# Patient Record
Sex: MALE
[De-identification: names, ages, dates, MRNs, and addresses within clinical notes are randomized per-mention and may not be internally consistent; named-entity substitution may affect disease eponyms.]

---

## 2023-03-04 ENCOUNTER — NURSE TRIAGE (OUTPATIENT)
Dept: OTHER | Facility: CLINIC | Age: 63
End: 2023-03-04

## 2023-03-05 NOTE — TELEPHONE ENCOUNTER
Location of patient: [place]    Subjective: Caller states \"I have a swollen jaw and it is painful. \"     Current Symptoms: left side jaw swelling, pain, hard to swallow and eat food, fatigue,     Onset: 1 week ago; sudden    Associated Symptoms: NA    Pain Severity: 6/10; aching; constant    Temperature: 99.6 orally    What has been tried: n/a    LMP: NA Pregnant: NA    Recommended disposition: Go to ED Now Patient is declining disposition and states he will go to walk in clinic in the am.    Care advice provided, patient verbalizes understanding; denies any other questions or concerns; instructed to call back for any new or worsening symptoms. Patient is declining disposition and states he will go to  [facility] in the am. I did advise patient to be seen tonight and gave him some risk factors for being seen. Patient states if he gets worse he will call back or go in tonight, otherwise he will go in the am.    This triage is a result of a call to [address] [place] [address]. Please do not respond to the triage nurse through this encounter. Any subsequent communication should be directly with the patient. Reason for Disposition   Fever    Protocols used:  Face Swelling-ADULT-AH

## 2023-04-10 ENCOUNTER — OFFICE VISIT (OUTPATIENT)
Dept: PRIMARY CARE | Facility: CLINIC | Age: 63
End: 2023-04-10
Payer: COMMERCIAL

## 2023-04-10 VITALS
DIASTOLIC BLOOD PRESSURE: 83 MMHG | HEART RATE: 67 BPM | SYSTOLIC BLOOD PRESSURE: 128 MMHG | BODY MASS INDEX: 30.1 KG/M2 | TEMPERATURE: 98 F | HEIGHT: 71 IN | OXYGEN SATURATION: 96 % | WEIGHT: 215 LBS

## 2023-04-10 DIAGNOSIS — N52.9 ERECTILE DYSFUNCTION, UNSPECIFIED ERECTILE DYSFUNCTION TYPE: ICD-10-CM

## 2023-04-10 DIAGNOSIS — Z12.5 PROSTATE CANCER SCREENING: ICD-10-CM

## 2023-04-10 DIAGNOSIS — Z00.00 ANNUAL PHYSICAL EXAM: Primary | ICD-10-CM

## 2023-04-10 DIAGNOSIS — Z00.00 HEALTHCARE MAINTENANCE: ICD-10-CM

## 2023-04-10 PROBLEM — J30.2 SEASONAL ALLERGIES: Status: ACTIVE | Noted: 2023-04-10

## 2023-04-10 PROBLEM — J20.9 ACUTE BRONCHITIS WITH BRONCHOSPASM: Status: ACTIVE | Noted: 2023-04-10

## 2023-04-10 PROBLEM — S39.012A LUMBOSACRAL STRAIN: Status: ACTIVE | Noted: 2023-04-10

## 2023-04-10 PROBLEM — H93.13 SUBJECTIVE TINNITUS OF BOTH EARS: Status: ACTIVE | Noted: 2023-04-10

## 2023-04-10 PROBLEM — R06.83 LOUD SNORING: Status: ACTIVE | Noted: 2023-04-10

## 2023-04-10 PROBLEM — G47.00 INSOMNIA: Status: ACTIVE | Noted: 2023-04-10

## 2023-04-10 PROBLEM — E66.811 CLASS 1 OBESITY WITH BODY MASS INDEX (BMI) OF 30.0 TO 30.9 IN ADULT: Status: ACTIVE | Noted: 2023-04-10

## 2023-04-10 PROBLEM — D12.6 TUBULAR ADENOMA OF COLON: Status: ACTIVE | Noted: 2023-04-10

## 2023-04-10 PROBLEM — K63.5 POLYP, COLONIC: Status: ACTIVE | Noted: 2023-04-10

## 2023-04-10 PROBLEM — E66.9 CLASS 1 OBESITY WITH BODY MASS INDEX (BMI) OF 30.0 TO 30.9 IN ADULT: Status: ACTIVE | Noted: 2023-04-10

## 2023-04-10 PROBLEM — J22 ACUTE RESPIRATORY INFECTION: Status: ACTIVE | Noted: 2023-04-10

## 2023-04-10 PROBLEM — R93.1 AGATSTON CORONARY ARTERY CALCIUM SCORE BETWEEN 200 AND 399: Status: ACTIVE | Noted: 2023-04-10

## 2023-04-10 PROBLEM — K11.20 SUBMANDIBULAR SIALOADENITIS: Status: ACTIVE | Noted: 2023-04-10

## 2023-04-10 PROBLEM — R59.0 LYMPHADENOPATHY, CERVICAL: Status: ACTIVE | Noted: 2023-04-10

## 2023-04-10 PROBLEM — H61.23 EXCESSIVE CERUMEN IN BOTH EAR CANALS: Status: ACTIVE | Noted: 2023-04-10

## 2023-04-10 PROBLEM — R05.9 COUGH: Status: ACTIVE | Noted: 2023-04-10

## 2023-04-10 PROBLEM — E78.5 HYPERLIPIDEMIA: Status: ACTIVE | Noted: 2023-04-10

## 2023-04-10 PROBLEM — J30.9 ALLERGIC RHINITIS: Status: ACTIVE | Noted: 2023-04-10

## 2023-04-10 PROBLEM — R73.01 IMPAIRED FASTING GLUCOSE: Status: ACTIVE | Noted: 2023-04-10

## 2023-04-10 PROBLEM — R22.1 NECK SWELLING: Status: ACTIVE | Noted: 2023-04-10

## 2023-04-10 PROCEDURE — 99396 PREV VISIT EST AGE 40-64: CPT | Performed by: INTERNAL MEDICINE

## 2023-04-10 PROCEDURE — 93000 ELECTROCARDIOGRAM COMPLETE: CPT | Performed by: INTERNAL MEDICINE

## 2023-04-10 RX ORDER — FEXOFENADINE HCL AND PSEUDOEPHEDRINE HCI 180; 240 MG/1; MG/1
1 TABLET, EXTENDED RELEASE ORAL AS NEEDED
COMMUNITY
End: 2024-06-06 | Stop reason: WASHOUT

## 2023-04-10 RX ORDER — ACETAMINOPHEN, DIPHENHYDRAMINE HCL, PHENYLEPHRINE HCL 325; 25; 5 MG/1; MG/1; MG/1
10 TABLET ORAL NIGHTLY PRN
COMMUNITY
End: 2024-06-06 | Stop reason: WASHOUT

## 2023-04-10 RX ORDER — ASCORBIC ACID 500 MG
TABLET ORAL
COMMUNITY
End: 2023-10-10 | Stop reason: SDUPTHER

## 2023-04-10 RX ORDER — SILDENAFIL 100 MG/1
100 TABLET, FILM COATED ORAL DAILY PRN
Qty: 12 TABLET | Refills: 3 | Status: SHIPPED | OUTPATIENT
Start: 2023-04-10 | End: 2024-04-23

## 2023-04-10 RX ORDER — UBIDECARENONE 75 MG
500 CAPSULE ORAL DAILY
COMMUNITY
End: 2024-06-06 | Stop reason: WASHOUT

## 2023-04-10 ASSESSMENT — PATIENT HEALTH QUESTIONNAIRE - PHQ9
1. LITTLE INTEREST OR PLEASURE IN DOING THINGS: NOT AT ALL
SUM OF ALL RESPONSES TO PHQ9 QUESTIONS 1 AND 2: 0
2. FEELING DOWN, DEPRESSED OR HOPELESS: NOT AT ALL

## 2023-04-10 NOTE — PROGRESS NOTES
"Subjective   Patient ID: Torsten Sheehan is a 62 y.o. male who presents for Annual Exam.    Here for CPE.  He was seen 3/2023 for covid and infected salivary gland  by ENT and ER,treated with Amoxicicllin,also took zinc and B12,vitamin C.  He quit smoking in 2005,was 1 ppd        Review of Systems   Constitutional:  Negative for fatigue and unexpected weight change.   HENT: Negative.  Negative for congestion.         As HPI.   Eyes: Negative.    Respiratory: Negative.  Negative for cough, chest tightness and shortness of breath.    Cardiovascular:  Negative for chest pain and leg swelling.   Gastrointestinal: Negative.  Negative for abdominal pain, blood in stool, diarrhea and nausea.   Endocrine: Negative.    Genitourinary: Negative.  Negative for difficulty urinating and dysuria.        Has ED.   Neurological: Negative.  Negative for dizziness and headaches.   Hematological: Negative.    Psychiatric/Behavioral: Negative.         Objective   /83 (BP Location: Left arm, Patient Position: Sitting, BP Cuff Size: Large adult)   Pulse 67   Temp 36.7 °C (98 °F) (Temporal)   Ht 1.803 m (5' 11\")   Wt 97.5 kg (215 lb)   SpO2 96%   BMI 29.99 kg/m²     Physical Exam  Vitals reviewed.   Constitutional:       Appearance: Normal appearance.   HENT:      Head: Normocephalic and atraumatic.      Right Ear: Tympanic membrane, ear canal and external ear normal.      Left Ear: Tympanic membrane, ear canal and external ear normal.      Mouth/Throat:      Mouth: Mucous membranes are moist.      Pharynx: Oropharynx is clear.   Eyes:      Extraocular Movements: Extraocular movements intact.      Pupils: Pupils are equal, round, and reactive to light.   Cardiovascular:      Rate and Rhythm: Normal rate and regular rhythm.      Heart sounds: Normal heart sounds.   Pulmonary:      Effort: Pulmonary effort is normal. No respiratory distress.      Breath sounds: Normal breath sounds. No wheezing or rhonchi.   Abdominal:      General: " Abdomen is flat. Bowel sounds are normal.      Palpations: Abdomen is soft.      Tenderness: There is no abdominal tenderness.   Musculoskeletal:         General: Normal range of motion.      Cervical back: Normal range of motion and neck supple.   Skin:     General: Skin is warm and dry.   Neurological:      General: No focal deficit present.      Mental Status: He is alert and oriented to person, place, and time.   Psychiatric:         Mood and Affect: Mood normal.         Behavior: Behavior normal.         Assessment/Plan

## 2023-04-15 ASSESSMENT — ENCOUNTER SYMPTOMS
NAUSEA: 0
DIZZINESS: 0
DIARRHEA: 0
DYSURIA: 0
RESPIRATORY NEGATIVE: 1
DIFFICULTY URINATING: 0
HEMATOLOGIC/LYMPHATIC NEGATIVE: 1
ENDOCRINE NEGATIVE: 1
FATIGUE: 0
SHORTNESS OF BREATH: 0
HEADACHES: 0
ABDOMINAL PAIN: 0
EYES NEGATIVE: 1
PSYCHIATRIC NEGATIVE: 1
CHEST TIGHTNESS: 0
COUGH: 0
UNEXPECTED WEIGHT CHANGE: 0
BLOOD IN STOOL: 0
NEUROLOGICAL NEGATIVE: 1
GASTROINTESTINAL NEGATIVE: 1

## 2023-04-15 NOTE — PATIENT INSTRUCTIONS
I spent >15 minutes face to face with individual providing recommendations for nutrition choices and exercise plan to help achieve weight reduction.  Order fasting labs.  See ENT for follow up.  Pt declined any immunizations.  Start Viagra PRN.  Return in 1 year and as needed.

## 2023-05-05 ENCOUNTER — LAB (OUTPATIENT)
Dept: LAB | Facility: LAB | Age: 63
End: 2023-05-05
Payer: COMMERCIAL

## 2023-05-05 DIAGNOSIS — Z12.5 PROSTATE CANCER SCREENING: ICD-10-CM

## 2023-05-05 DIAGNOSIS — Z00.00 ANNUAL PHYSICAL EXAM: ICD-10-CM

## 2023-05-05 LAB
ALANINE AMINOTRANSFERASE (SGPT) (U/L) IN SER/PLAS: 18 U/L (ref 10–52)
ALBUMIN (G/DL) IN SER/PLAS: 4.4 G/DL (ref 3.4–5)
ALKALINE PHOSPHATASE (U/L) IN SER/PLAS: 51 U/L (ref 33–136)
ANION GAP IN SER/PLAS: 15 MMOL/L (ref 10–20)
ASPARTATE AMINOTRANSFERASE (SGOT) (U/L) IN SER/PLAS: 14 U/L (ref 9–39)
BASOPHILS (10*3/UL) IN BLOOD BY AUTOMATED COUNT: 0.06 X10E9/L (ref 0–0.1)
BASOPHILS/100 LEUKOCYTES IN BLOOD BY AUTOMATED COUNT: 1 % (ref 0–2)
BILIRUBIN TOTAL (MG/DL) IN SER/PLAS: 0.8 MG/DL (ref 0–1.2)
CALCIUM (MG/DL) IN SER/PLAS: 9.3 MG/DL (ref 8.6–10.3)
CARBON DIOXIDE, TOTAL (MMOL/L) IN SER/PLAS: 26 MMOL/L (ref 21–32)
CHLORIDE (MMOL/L) IN SER/PLAS: 101 MMOL/L (ref 98–107)
CHOLESTEROL (MG/DL) IN SER/PLAS: 199 MG/DL (ref 0–199)
CHOLESTEROL IN HDL (MG/DL) IN SER/PLAS: 62.5 MG/DL
CHOLESTEROL/HDL RATIO: 3.2
CREATININE (MG/DL) IN SER/PLAS: 1.1 MG/DL (ref 0.5–1.3)
EOSINOPHILS (10*3/UL) IN BLOOD BY AUTOMATED COUNT: 0.26 X10E9/L (ref 0–0.7)
EOSINOPHILS/100 LEUKOCYTES IN BLOOD BY AUTOMATED COUNT: 4.2 % (ref 0–6)
ERYTHROCYTE DISTRIBUTION WIDTH (RATIO) BY AUTOMATED COUNT: 13.5 % (ref 11.5–14.5)
ERYTHROCYTE MEAN CORPUSCULAR HEMOGLOBIN CONCENTRATION (G/DL) BY AUTOMATED: 31.8 G/DL (ref 32–36)
ERYTHROCYTE MEAN CORPUSCULAR VOLUME (FL) BY AUTOMATED COUNT: 86 FL (ref 80–100)
ERYTHROCYTES (10*6/UL) IN BLOOD BY AUTOMATED COUNT: 5.17 X10E12/L (ref 4.5–5.9)
GFR MALE: 76 ML/MIN/1.73M2
GLUCOSE (MG/DL) IN SER/PLAS: 98 MG/DL (ref 74–99)
HEMATOCRIT (%) IN BLOOD BY AUTOMATED COUNT: 44.4 % (ref 41–52)
HEMOGLOBIN (G/DL) IN BLOOD: 14.1 G/DL (ref 13.5–17.5)
IMMATURE GRANULOCYTES/100 LEUKOCYTES IN BLOOD BY AUTOMATED COUNT: 0.2 % (ref 0–0.9)
LDL: 109 MG/DL (ref 0–99)
LEUKOCYTES (10*3/UL) IN BLOOD BY AUTOMATED COUNT: 6.1 X10E9/L (ref 4.4–11.3)
LYMPHOCYTES (10*3/UL) IN BLOOD BY AUTOMATED COUNT: 2.01 X10E9/L (ref 1.2–4.8)
LYMPHOCYTES/100 LEUKOCYTES IN BLOOD BY AUTOMATED COUNT: 32.8 % (ref 13–44)
MONOCYTES (10*3/UL) IN BLOOD BY AUTOMATED COUNT: 0.52 X10E9/L (ref 0.1–1)
MONOCYTES/100 LEUKOCYTES IN BLOOD BY AUTOMATED COUNT: 8.5 % (ref 2–10)
NEUTROPHILS (10*3/UL) IN BLOOD BY AUTOMATED COUNT: 3.27 X10E9/L (ref 1.2–7.7)
NEUTROPHILS/100 LEUKOCYTES IN BLOOD BY AUTOMATED COUNT: 53.3 % (ref 40–80)
PLATELETS (10*3/UL) IN BLOOD AUTOMATED COUNT: 180 X10E9/L (ref 150–450)
POTASSIUM (MMOL/L) IN SER/PLAS: 4 MMOL/L (ref 3.5–5.3)
PROSTATE SPECIFIC AG (NG/ML) IN SER/PLAS: 0.3 NG/ML (ref 0–4)
PROTEIN TOTAL: 6.9 G/DL (ref 6.4–8.2)
SODIUM (MMOL/L) IN SER/PLAS: 138 MMOL/L (ref 136–145)
THYROTROPIN (MIU/L) IN SER/PLAS BY DETECTION LIMIT <= 0.05 MIU/L: 2.06 MIU/L (ref 0.44–3.98)
TRIGLYCERIDE (MG/DL) IN SER/PLAS: 140 MG/DL (ref 0–149)
UREA NITROGEN (MG/DL) IN SER/PLAS: 16 MG/DL (ref 6–23)
VLDL: 28 MG/DL (ref 0–40)

## 2023-05-05 PROCEDURE — 80061 LIPID PANEL: CPT

## 2023-05-05 PROCEDURE — 84443 ASSAY THYROID STIM HORMONE: CPT

## 2023-05-05 PROCEDURE — 80053 COMPREHEN METABOLIC PANEL: CPT

## 2023-05-05 PROCEDURE — 36415 COLL VENOUS BLD VENIPUNCTURE: CPT

## 2023-05-05 PROCEDURE — 84153 ASSAY OF PSA TOTAL: CPT

## 2023-05-05 PROCEDURE — 83036 HEMOGLOBIN GLYCOSYLATED A1C: CPT

## 2023-05-05 PROCEDURE — 85025 COMPLETE CBC W/AUTO DIFF WBC: CPT

## 2023-05-06 LAB
ESTIMATED AVERAGE GLUCOSE FOR HBA1C: 123 MG/DL
HEMOGLOBIN A1C/HEMOGLOBIN TOTAL IN BLOOD: 5.9 %

## 2023-05-07 NOTE — RESULT ENCOUNTER NOTE
Your sugar and cholesterol have improved,rest of lab results within normal range.Follow low fat and low carb diet.

## 2023-06-01 ENCOUNTER — HOSPITAL ENCOUNTER (OUTPATIENT)
Dept: DATA CONVERSION | Facility: HOSPITAL | Age: 63
End: 2023-06-01
Attending: STUDENT IN AN ORGANIZED HEALTH CARE EDUCATION/TRAINING PROGRAM | Admitting: STUDENT IN AN ORGANIZED HEALTH CARE EDUCATION/TRAINING PROGRAM
Payer: COMMERCIAL

## 2023-06-01 DIAGNOSIS — R59.0 LOCALIZED ENLARGED LYMPH NODES: ICD-10-CM

## 2023-06-09 LAB
COMPLETE PATHOLOGY REPORT: NORMAL
CONVERTED CLINICAL DIAGNOSIS-HISTORY: NORMAL
CONVERTED FINAL DIAGNOSIS: NORMAL
CONVERTED FINAL REPORT PDF LINK TO COPY AND PASTE: NORMAL
CONVERTED GROSS DESCRIPTION: NORMAL

## 2023-09-07 VITALS — BODY MASS INDEX: 29.32 KG/M2 | HEIGHT: 71 IN | WEIGHT: 209.44 LBS

## 2023-10-10 ENCOUNTER — OFFICE VISIT (OUTPATIENT)
Dept: PRIMARY CARE | Facility: CLINIC | Age: 63
End: 2023-10-10
Payer: COMMERCIAL

## 2023-10-10 VITALS
HEIGHT: 70 IN | BODY MASS INDEX: 29.72 KG/M2 | SYSTOLIC BLOOD PRESSURE: 120 MMHG | WEIGHT: 207.6 LBS | HEART RATE: 72 BPM | DIASTOLIC BLOOD PRESSURE: 78 MMHG | OXYGEN SATURATION: 93 % | TEMPERATURE: 97.4 F

## 2023-10-10 DIAGNOSIS — G89.29 CHRONIC BILATERAL LOW BACK PAIN WITHOUT SCIATICA: ICD-10-CM

## 2023-10-10 DIAGNOSIS — E66.09 CLASS 1 OBESITY DUE TO EXCESS CALORIES WITHOUT SERIOUS COMORBIDITY WITH BODY MASS INDEX (BMI) OF 30.0 TO 30.9 IN ADULT: ICD-10-CM

## 2023-10-10 DIAGNOSIS — Z00.00 ANNUAL PHYSICAL EXAM: ICD-10-CM

## 2023-10-10 DIAGNOSIS — R73.01 IMPAIRED FASTING GLUCOSE: ICD-10-CM

## 2023-10-10 DIAGNOSIS — M54.50 CHRONIC BILATERAL LOW BACK PAIN WITHOUT SCIATICA: ICD-10-CM

## 2023-10-10 DIAGNOSIS — Z12.5 PROSTATE CANCER SCREENING: Primary | ICD-10-CM

## 2023-10-10 PROBLEM — R59.0 LYMPHADENOPATHY, CERVICAL: Status: RESOLVED | Noted: 2023-04-10 | Resolved: 2023-10-10

## 2023-10-10 PROBLEM — R22.1 NECK SWELLING: Status: RESOLVED | Noted: 2023-04-10 | Resolved: 2023-10-10

## 2023-10-10 PROCEDURE — 1036F TOBACCO NON-USER: CPT | Performed by: INTERNAL MEDICINE

## 2023-10-10 PROCEDURE — 3008F BODY MASS INDEX DOCD: CPT | Performed by: INTERNAL MEDICINE

## 2023-10-10 PROCEDURE — 99214 OFFICE O/P EST MOD 30 MIN: CPT | Performed by: INTERNAL MEDICINE

## 2023-10-10 ASSESSMENT — ENCOUNTER SYMPTOMS
BACK PAIN: 1
RESPIRATORY NEGATIVE: 1
EYES NEGATIVE: 1
SHORTNESS OF BREATH: 0
HEMATOLOGIC/LYMPHATIC NEGATIVE: 1
DIZZINESS: 0
UNEXPECTED WEIGHT CHANGE: 0
HEADACHES: 0
FATIGUE: 0
BLOOD IN STOOL: 0
CHEST TIGHTNESS: 0
DIFFICULTY URINATING: 0
ENDOCRINE NEGATIVE: 1
PSYCHIATRIC NEGATIVE: 1
ABDOMINAL PAIN: 0
DIARRHEA: 0
COUGH: 0
NAUSEA: 0
DYSURIA: 0
GASTROINTESTINAL NEGATIVE: 1
NEUROLOGICAL NEGATIVE: 1

## 2023-10-10 NOTE — ASSESSMENT & PLAN NOTE
Follow 1800-calorie ADA diet and will repeat his lab at his next physical in April, lab order in.  Continue regular walking.  Patient refused flu vaccine.

## 2023-10-10 NOTE — PROGRESS NOTES
"Subjective   Patient ID: Torsten Sheehan is a 63 y.o. male who presents for 6 month follow up .    Here to follow-up on his general medical condition and to go again over his lab result.  He has been walking regularly .  He had biopsy of the lymph node left side neck and was benign, he denies any trouble swallowing night sweats or weight loss.  He has been seen by a chiropractor for low back pain and had x-ray there has been doing stretching exercise there is some improvement with his low back pain. Pain started after he lifted up a heavy suitcase.  Pt never seen by a dermatologist for general skin exam and asking if he needed to, I added I advised him to see dermatologist for general skin examination and I put a referral in.         Review of Systems   Constitutional:  Negative for fatigue and unexpected weight change.   HENT: Negative.  Negative for congestion.    Eyes: Negative.    Respiratory: Negative.  Negative for cough, chest tightness and shortness of breath.    Cardiovascular:  Negative for chest pain and leg swelling.   Gastrointestinal: Negative.  Negative for abdominal pain, blood in stool, diarrhea and nausea.   Endocrine: Negative.    Genitourinary: Negative.  Negative for difficulty urinating and dysuria.   Musculoskeletal:  Positive for back pain.   Neurological: Negative.  Negative for dizziness and headaches.   Hematological: Negative.    Psychiatric/Behavioral: Negative.         Objective   /78 (BP Location: Right arm, Patient Position: Sitting)   Pulse 72   Temp 36.3 °C (97.4 °F) (Temporal)   Ht 1.778 m (5' 10\")   Wt 94.2 kg (207 lb 9.6 oz)   SpO2 93%   BMI 29.79 kg/m²     Physical Exam  Vitals reviewed.   Constitutional:       Appearance: Normal appearance.   HENT:      Head: Normocephalic and atraumatic.   Eyes:      Extraocular Movements: Extraocular movements intact.      Pupils: Pupils are equal, round, and reactive to light.   Cardiovascular:      Rate and Rhythm: Normal rate and " regular rhythm.      Heart sounds: Normal heart sounds.   Pulmonary:      Effort: Pulmonary effort is normal. No respiratory distress.      Breath sounds: Normal breath sounds. No wheezing or rhonchi.   Abdominal:      General: Abdomen is flat. Bowel sounds are normal.      Palpations: Abdomen is soft.      Tenderness: There is no abdominal tenderness.   Musculoskeletal:         General: Normal range of motion.      Cervical back: Normal range of motion and neck supple.      Right lower leg: No edema.      Left lower leg: No edema.   Lymphadenopathy:      Cervical: No cervical adenopathy.   Skin:     General: Skin is warm and dry.   Neurological:      General: No focal deficit present.      Mental Status: He is alert and oriented to person, place, and time.   Psychiatric:         Mood and Affect: Mood normal.         Behavior: Behavior normal.         Assessment/Plan   Problem List Items Addressed This Visit             ICD-10-CM    Impaired fasting glucose R73.01     Follow 1800-calorie ADA diet and will repeat his lab at his next physical in April, lab order in.  Continue regular walking.  Patient refused flu vaccine.         Class 1 obesity with body mass index (BMI) of 30.0 to 30.9 in adult E66.9, Z68.30     Improved with walking .  counseling for healthy eating habit provided his BMI is now 29.79.         Chronic bilateral low back pain without sciatica M54.50, G89.29     Improved with chiropractor, advised to continue stretching exercise.          Other Visit Diagnoses         Codes    Prostate cancer screening    -  Primary Z12.5    Relevant Orders    Prostate Specific Antigen    Annual physical exam     Z00.00    Relevant Orders    CBC and Auto Differential    Comprehensive Metabolic Panel    Hemoglobin A1C    Lipid Panel    Referral to Dermatology

## 2024-01-12 ENCOUNTER — TELEMEDICINE (OUTPATIENT)
Dept: PRIMARY CARE | Facility: CLINIC | Age: 64
End: 2024-01-12
Payer: COMMERCIAL

## 2024-01-12 VITALS
BODY MASS INDEX: 30.28 KG/M2 | HEART RATE: 88 BPM | SYSTOLIC BLOOD PRESSURE: 126 MMHG | WEIGHT: 211 LBS | OXYGEN SATURATION: 98 % | DIASTOLIC BLOOD PRESSURE: 75 MMHG

## 2024-01-12 DIAGNOSIS — J01.80 ACUTE NON-RECURRENT SINUSITIS OF OTHER SINUS: Primary | ICD-10-CM

## 2024-01-12 PROCEDURE — 99213 OFFICE O/P EST LOW 20 MIN: CPT | Performed by: NURSE PRACTITIONER

## 2024-01-12 RX ORDER — ALBUTEROL SULFATE 90 UG/1
2 AEROSOL, METERED RESPIRATORY (INHALATION) EVERY 6 HOURS PRN
Qty: 18 G | Refills: 11 | Status: SHIPPED | OUTPATIENT
Start: 2024-01-12 | End: 2025-01-11

## 2024-01-12 RX ORDER — BENZONATATE 200 MG/1
200 CAPSULE ORAL 3 TIMES DAILY PRN
Qty: 42 CAPSULE | Refills: 0 | Status: SHIPPED | OUTPATIENT
Start: 2024-01-12 | End: 2024-02-11

## 2024-01-12 RX ORDER — AZITHROMYCIN 250 MG/1
TABLET, FILM COATED ORAL
Qty: 6 TABLET | Refills: 1 | Status: SHIPPED | OUTPATIENT
Start: 2024-01-12 | End: 2024-01-17

## 2024-01-12 ASSESSMENT — ENCOUNTER SYMPTOMS
WHEEZING: 0
SWOLLEN GLANDS: 0
RHINORRHEA: 1
COUGH: 1
SORE THROAT: 0
SINUS PAIN: 1
VOMITING: 0
DIARRHEA: 0
HEADACHES: 0
NAUSEA: 0
DYSURIA: 0

## 2024-01-12 NOTE — PROGRESS NOTES
Subjective   Patient ID: Torsten Sheehan is a 63 y.o. male who presents for URI (Neg covid test.).    Presents with a lot congestion which is worsening.     Has used chi seltzer cold and sinus and albuterol.  Tried using a steam at night.     URI   There has been no fever. Associated symptoms include congestion, coughing, rhinorrhea and sinus pain. Pertinent negatives include no chest pain, diarrhea, dysuria, ear pain, headaches, nausea, plugged ear sensation, sore throat, swollen glands, vomiting or wheezing.        Review of Systems   HENT:  Positive for congestion, rhinorrhea and sinus pain. Negative for ear pain and sore throat.    Respiratory:  Positive for cough. Negative for wheezing.    Cardiovascular:  Negative for chest pain.   Gastrointestinal:  Negative for diarrhea, nausea and vomiting.   Genitourinary:  Negative for dysuria.   Neurological:  Negative for headaches.     otherwise negative aside from what was mentioned above in HPI.    Objective   /75   Pulse 88   Wt 95.7 kg (211 lb)   SpO2 98%   BMI 30.28 kg/m²     Physical Exam  This visit was completed via telephone/virtual visit. All issues as documented below were discussed and addressed but no physical exam was performed. If it was felt that the patient should be evaluated via  face-to-face office appointment(s) they were directed there. The patient verbally consented to this visit.    Assessment/Plan   Problem List Items Addressed This Visit    None  Visit Diagnoses         Codes    Acute non-recurrent sinusitis of other sinus    -  Primary J01.80    Relevant Medications    azithromycin (Zithromax) 250 mg tablet    albuterol 90 mcg/actuation inhaler    benzonatate (Tessalon) 200 mg capsule          Nasal rinses, humification/hot shower until mucous drains, increase fluid intake aiming for half your body weight in oz of water daily. This will help to thin mucous secretion and replace fluids that have been lost.  Warm, moist air, nasal  saline hydration, avoidance of nasal irritants including cigarette smoke.  Sip hot or warm drinks, this may soothe nasal passages  Avoid extremely cold and dry air  Tylenol/Advil for muscle aches and fever.  Warm salt water gargles, throat lozenges and popsicle's for any sore throat.    Return in 1 week if not improving.

## 2024-02-26 ENCOUNTER — OFFICE VISIT (OUTPATIENT)
Dept: DERMATOLOGY | Facility: CLINIC | Age: 64
End: 2024-02-26
Payer: COMMERCIAL

## 2024-02-26 DIAGNOSIS — Z00.00 ANNUAL PHYSICAL EXAM: ICD-10-CM

## 2024-02-26 DIAGNOSIS — L81.4 LENTIGO: ICD-10-CM

## 2024-02-26 DIAGNOSIS — D18.01 HEMANGIOMA OF SKIN: ICD-10-CM

## 2024-02-26 DIAGNOSIS — L57.0 ACTINIC KERATOSES: ICD-10-CM

## 2024-02-26 DIAGNOSIS — L57.8 OTHER SKIN CHANGES DUE TO CHRONIC EXPOSURE TO NONIONIZING RADIATION: Primary | ICD-10-CM

## 2024-02-26 DIAGNOSIS — L82.1 SEBORRHEIC KERATOSIS: ICD-10-CM

## 2024-02-26 PROCEDURE — 1036F TOBACCO NON-USER: CPT | Performed by: STUDENT IN AN ORGANIZED HEALTH CARE EDUCATION/TRAINING PROGRAM

## 2024-02-26 PROCEDURE — 17003 DESTRUCT PREMALG LES 2-14: CPT | Performed by: STUDENT IN AN ORGANIZED HEALTH CARE EDUCATION/TRAINING PROGRAM

## 2024-02-26 PROCEDURE — 17000 DESTRUCT PREMALG LESION: CPT | Performed by: STUDENT IN AN ORGANIZED HEALTH CARE EDUCATION/TRAINING PROGRAM

## 2024-02-26 PROCEDURE — 99203 OFFICE O/P NEW LOW 30 MIN: CPT | Performed by: STUDENT IN AN ORGANIZED HEALTH CARE EDUCATION/TRAINING PROGRAM

## 2024-02-26 PROCEDURE — 3008F BODY MASS INDEX DOCD: CPT | Performed by: STUDENT IN AN ORGANIZED HEALTH CARE EDUCATION/TRAINING PROGRAM

## 2024-02-26 NOTE — PROGRESS NOTES
Subjective     Torsten Sheehan is a 63 y.o. male who presents for the following: Skin Check (FBSE. No family or personal history of skin cancer. ).     Review of Systems:  No other skin or systemic complaints other than what is documented elsewhere in the note.    The following portions of the chart were reviewed this encounter and updated as appropriate:         Skin Cancer History  No skin cancer on file.      Specialty Problems    None       Objective   Well appearing patient in no apparent distress; mood and affect are within normal limits.    A full examination was performed including scalp, head, eyes, ears, nose, lips, neck, chest, axillae, abdomen, back, buttocks, bilateral upper extremities, bilateral lower extremities, hands, feet, fingers, toes, fingernails, and toenails. All findings within normal limits unless otherwise noted below.    Assessment/Plan   1. Other skin changes due to chronic exposure to nonionizing radiation  Mottled pigmentation, telangiectasias and brown reticular macules in sun exposed areas on the face, extremities, trunk    The risk of chronic, cumulative sun damage and risk of development of skin cancer was reviewed with the patient today. Discussed important of sun protection with sun protective clothing and/or broad spectrum sunscreen spf 30 or above.  Warning signs of skin cancer were reviewed. Patient to contact office should they notice any new or changing pre-existing skin lesion.    2. Annual physical exam    Related Procedures  Referral to Dermatology    3. Seborrheic keratosis  Stuck on verrucous, tan-brown papules and plaques.      The benign nature of these skin lesions were reviewed with the patient today and reassurance was provided. Patient advised to return for f/u if the lesions change in size, shape, color, become painful, tender, itch or bleed.    4. Hemangioma of skin  Bright red papules    Discussed benign nature of condition, reassured. Reviewed warning signs of  skin cancer with patient.    5. Lentigo  Scattered tan macules in sun-exposed areas.    Benign nature of these skin lesions reviewed and relation to sun exposure discussed. Reassurance provided. Reviewed warning signs of skin cancer.    6. Actinic keratoses (5)  Mid Parietal Scalp (5)  Erythematous gritty macule(s)    Lesions are due to chronic, cumulative sun damage over time and are pre-cancerous. They have a risk of developing into squamous cell carcinoma and therefore treatment recommendations were offered and discussed with the patient. Discussed LN2 & topical chemotherapy creams, risks and benefits of each. The risks and benefits of LN2 were reviewed including incomplete removal, crusting, blister hypo and/or hyperpigmentation, scarring. The patient elected for LN2 today.    Destr of lesion - Mid Parietal Scalp (5)  Complexity: simple    Destruction method: cryotherapy    Informed consent: discussed and consent obtained    Lesion destroyed using liquid nitrogen: Yes    Cryotherapy cycles:  1  Outcome: patient tolerated procedure well with no complications    Post-procedure details: wound care instructions given

## 2024-04-10 ENCOUNTER — APPOINTMENT (OUTPATIENT)
Dept: PRIMARY CARE | Facility: CLINIC | Age: 64
End: 2024-04-10
Payer: COMMERCIAL

## 2024-04-23 ENCOUNTER — OFFICE VISIT (OUTPATIENT)
Dept: PRIMARY CARE | Facility: CLINIC | Age: 64
End: 2024-04-23
Payer: COMMERCIAL

## 2024-04-23 VITALS
DIASTOLIC BLOOD PRESSURE: 75 MMHG | WEIGHT: 210.4 LBS | HEART RATE: 65 BPM | HEIGHT: 71 IN | BODY MASS INDEX: 29.46 KG/M2 | OXYGEN SATURATION: 95 % | SYSTOLIC BLOOD PRESSURE: 120 MMHG

## 2024-04-23 DIAGNOSIS — I71.21 ANEURYSM OF ASCENDING AORTA WITHOUT RUPTURE (CMS-HCC): ICD-10-CM

## 2024-04-23 DIAGNOSIS — R93.1 AGATSTON CAC SCORE 200-399: ICD-10-CM

## 2024-04-23 DIAGNOSIS — E78.49 OTHER HYPERLIPIDEMIA: ICD-10-CM

## 2024-04-23 DIAGNOSIS — R93.1 AGATSTON CORONARY ARTERY CALCIUM SCORE BETWEEN 200 AND 399: ICD-10-CM

## 2024-04-23 DIAGNOSIS — D12.6 TUBULAR ADENOMA OF COLON: ICD-10-CM

## 2024-04-23 DIAGNOSIS — Z00.00 HEALTHCARE MAINTENANCE: ICD-10-CM

## 2024-04-23 DIAGNOSIS — J30.9 ALLERGIC RHINITIS, UNSPECIFIED SEASONALITY, UNSPECIFIED TRIGGER: ICD-10-CM

## 2024-04-23 DIAGNOSIS — Z00.00 ANNUAL PHYSICAL EXAM: Primary | ICD-10-CM

## 2024-04-23 DIAGNOSIS — K11.20 SUBMANDIBULAR SIALOADENITIS: ICD-10-CM

## 2024-04-23 PROBLEM — N52.9 ERECTILE DYSFUNCTION: Status: ACTIVE | Noted: 2024-04-23

## 2024-04-23 PROCEDURE — 3008F BODY MASS INDEX DOCD: CPT | Performed by: INTERNAL MEDICINE

## 2024-04-23 PROCEDURE — 99396 PREV VISIT EST AGE 40-64: CPT | Performed by: INTERNAL MEDICINE

## 2024-04-23 PROCEDURE — 93000 ELECTROCARDIOGRAM COMPLETE: CPT | Performed by: INTERNAL MEDICINE

## 2024-04-23 RX ORDER — ROSUVASTATIN CALCIUM 5 MG/1
5 TABLET, COATED ORAL DAILY
Qty: 100 TABLET | Refills: 3 | Status: SHIPPED | OUTPATIENT
Start: 2024-04-23 | End: 2025-05-28

## 2024-04-23 ASSESSMENT — PATIENT HEALTH QUESTIONNAIRE - PHQ9
2. FEELING DOWN, DEPRESSED OR HOPELESS: NOT AT ALL
SUM OF ALL RESPONSES TO PHQ9 QUESTIONS 1 AND 2: 0
1. LITTLE INTEREST OR PLEASURE IN DOING THINGS: NOT AT ALL

## 2024-04-23 NOTE — PROGRESS NOTES
"Subjective   Patient ID: Torsten Sheehan is a 63 y.o. male who presents for Annual Exam.    Here for CPE.  He quit smoking in 2005,was 1 ppd   Last colonoscopy 1/7/2021,next in 5 years.  He sees chiropractor for his back pain,resolved.  He sees dermatologist.  He denies any chest pain shortness of breath or cough no recurrence of his neck mass, he was seen by ENT in past.  I Reviewed with patient his CT from February 2022 which showed elevated cardiac calcium score in the 225  and thoracic aortic aneurysm of 4 cm.           Review of Systems   Constitutional:  Negative for fatigue and unexpected weight change.   HENT: Negative.  Negative for congestion.    Eyes: Negative.    Respiratory: Negative.  Negative for cough, chest tightness and shortness of breath.    Cardiovascular:  Negative for chest pain and leg swelling.   Gastrointestinal: Negative.  Negative for abdominal pain, blood in stool, diarrhea and nausea.   Endocrine: Negative.    Genitourinary: Negative.  Negative for difficulty urinating and dysuria.   Musculoskeletal:         As HPI   Neurological: Negative.  Negative for dizziness and headaches.   Hematological: Negative.    Psychiatric/Behavioral: Negative.         Objective   /75 (BP Location: Left arm, Patient Position: Sitting, BP Cuff Size: Large adult)   Pulse 65   Ht 1.797 m (5' 10.75\")   Wt 95.4 kg (210 lb 6.4 oz)   SpO2 95%   BMI 29.55 kg/m²     Physical Exam  Vitals reviewed.   Constitutional:       General: He is not in acute distress.     Appearance: Normal appearance.   HENT:      Head: Normocephalic and atraumatic.      Right Ear: Tympanic membrane and ear canal normal.      Left Ear: Tympanic membrane and ear canal normal.      Nose: Nose normal.      Mouth/Throat:      Mouth: Mucous membranes are moist.      Pharynx: No oropharyngeal exudate or posterior oropharyngeal erythema.   Eyes:      Extraocular Movements: Extraocular movements intact.      Pupils: Pupils are equal, round, " and reactive to light.   Neck:      Vascular: No carotid bruit.   Cardiovascular:      Rate and Rhythm: Normal rate and regular rhythm.      Pulses: Normal pulses.      Heart sounds: Normal heart sounds. No murmur heard.  Pulmonary:      Effort: Pulmonary effort is normal.      Breath sounds: Normal breath sounds.   Abdominal:      General: Abdomen is flat. Bowel sounds are normal.      Palpations: Abdomen is soft.   Musculoskeletal:         General: Normal range of motion.      Cervical back: Normal range of motion and neck supple.   Lymphadenopathy:      Cervical: No cervical adenopathy.   Skin:     Comments: Skin exam done with dermatologist   Neurological:      General: No focal deficit present.      Mental Status: He is alert and oriented to person, place, and time.   Psychiatric:         Mood and Affect: Mood normal.         Assessment/Plan   Problem List Items Addressed This Visit             ICD-10-CM    Agatston coronary artery calcium score between 200 and 399 R93.1     Refer to cardiologist start statin.  Patient denies any chest pain.           Allergic rhinitis J30.9     Stable on current medication         Hyperlipidemia E78.5     Atherosclerotic vascular disease 7.2%.  He had cardiac calcium score of 225.  Will start statin and refer to cardiologist also to evaluate the ascending aortic aneurysm.  Will order CTA.         Submandibular sialoadenitis K11.20     No recurrence.  Was seen by ENT in past.         Tubular adenoma of colon D12.6     Patient denies any change in BM or blood in his stool.  Next colonoscopy in January 2026.         Annual physical exam - Primary Z00.00     Complete physical examination completed today.  Advised to keep a heart healthy, low-fat diet as recommended diet is the Mediterranean diet.  Advised to exercise regularly for 30 minutes daily 5 days a week and maintain 150 minutes of exercise per week.  Discussed age-appropriate cancer screening,Immunization and  recommendation were given.  Advised on regular eye and dental visit.  Advised on staying well-hydrated.          Other Visit Diagnoses         Codes    Healthcare maintenance     Z00.00    Relevant Orders    ECG 12 lead (Clinic Performed) (Completed)    Agatston CAC score 200-399     R93.1    Relevant Medications    rosuvastatin (Crestor) 5 mg tablet    Other Relevant Orders    Referral to Cardiology    Aneurysm of ascending aorta without rupture (CMS-HCC)     I71.21    Relevant Orders    CT angio chest w and wo IV contrast

## 2024-04-25 PROBLEM — J20.9 ACUTE BRONCHITIS WITH BRONCHOSPASM: Status: RESOLVED | Noted: 2023-04-10 | Resolved: 2024-04-25

## 2024-04-25 PROBLEM — J22 ACUTE RESPIRATORY INFECTION: Status: RESOLVED | Noted: 2023-04-10 | Resolved: 2024-04-25

## 2024-04-25 ASSESSMENT — ENCOUNTER SYMPTOMS
DYSURIA: 0
EYES NEGATIVE: 1
RESPIRATORY NEGATIVE: 1
FATIGUE: 0
HEADACHES: 0
UNEXPECTED WEIGHT CHANGE: 0
BLOOD IN STOOL: 0
DIZZINESS: 0
CHEST TIGHTNESS: 0
COUGH: 0
NEUROLOGICAL NEGATIVE: 1
DIFFICULTY URINATING: 0
SHORTNESS OF BREATH: 0
GASTROINTESTINAL NEGATIVE: 1
ABDOMINAL PAIN: 0
HEMATOLOGIC/LYMPHATIC NEGATIVE: 1
PSYCHIATRIC NEGATIVE: 1
ENDOCRINE NEGATIVE: 1
DIARRHEA: 0
NAUSEA: 0

## 2024-05-02 ENCOUNTER — LAB (OUTPATIENT)
Dept: LAB | Facility: LAB | Age: 64
End: 2024-05-02
Payer: COMMERCIAL

## 2024-05-02 DIAGNOSIS — Z00.00 ANNUAL PHYSICAL EXAM: ICD-10-CM

## 2024-05-02 DIAGNOSIS — Z12.5 PROSTATE CANCER SCREENING: ICD-10-CM

## 2024-05-02 LAB
ALBUMIN SERPL BCP-MCNC: 4.1 G/DL (ref 3.4–5)
ALP SERPL-CCNC: 52 U/L (ref 33–136)
ALT SERPL W P-5'-P-CCNC: 19 U/L (ref 10–52)
ANION GAP SERPL CALC-SCNC: 14 MMOL/L (ref 10–20)
AST SERPL W P-5'-P-CCNC: 16 U/L (ref 9–39)
BASOPHILS # BLD AUTO: 0.05 X10*3/UL (ref 0–0.1)
BASOPHILS NFR BLD AUTO: 0.9 %
BILIRUB SERPL-MCNC: 0.7 MG/DL (ref 0–1.2)
BUN SERPL-MCNC: 14 MG/DL (ref 6–23)
CALCIUM SERPL-MCNC: 9.2 MG/DL (ref 8.6–10.6)
CHLORIDE SERPL-SCNC: 106 MMOL/L (ref 98–107)
CHOLEST SERPL-MCNC: 150 MG/DL (ref 0–199)
CHOLESTEROL/HDL RATIO: 2.3
CO2 SERPL-SCNC: 28 MMOL/L (ref 21–32)
CREAT SERPL-MCNC: 1.03 MG/DL (ref 0.5–1.3)
EGFRCR SERPLBLD CKD-EPI 2021: 83 ML/MIN/1.73M*2
EOSINOPHIL # BLD AUTO: 0.29 X10*3/UL (ref 0–0.7)
EOSINOPHIL NFR BLD AUTO: 5.3 %
ERYTHROCYTE [DISTWIDTH] IN BLOOD BY AUTOMATED COUNT: 13.2 % (ref 11.5–14.5)
EST. AVERAGE GLUCOSE BLD GHB EST-MCNC: 126 MG/DL
GLUCOSE SERPL-MCNC: 99 MG/DL (ref 74–99)
HBA1C MFR BLD: 6 %
HCT VFR BLD AUTO: 44.3 % (ref 41–52)
HDLC SERPL-MCNC: 65.8 MG/DL
HGB BLD-MCNC: 13.9 G/DL (ref 13.5–17.5)
IMM GRANULOCYTES # BLD AUTO: 0 X10*3/UL (ref 0–0.7)
IMM GRANULOCYTES NFR BLD AUTO: 0 % (ref 0–0.9)
LDLC SERPL CALC-MCNC: 65 MG/DL
LYMPHOCYTES # BLD AUTO: 1.77 X10*3/UL (ref 1.2–4.8)
LYMPHOCYTES NFR BLD AUTO: 32.3 %
MCH RBC QN AUTO: 26.7 PG (ref 26–34)
MCHC RBC AUTO-ENTMCNC: 31.4 G/DL (ref 32–36)
MCV RBC AUTO: 85 FL (ref 80–100)
MONOCYTES # BLD AUTO: 0.46 X10*3/UL (ref 0.1–1)
MONOCYTES NFR BLD AUTO: 8.4 %
NEUTROPHILS # BLD AUTO: 2.91 X10*3/UL (ref 1.2–7.7)
NEUTROPHILS NFR BLD AUTO: 53.1 %
NON HDL CHOLESTEROL: 84 MG/DL (ref 0–149)
NRBC BLD-RTO: 0 /100 WBCS (ref 0–0)
PLATELET # BLD AUTO: 166 X10*3/UL (ref 150–450)
POTASSIUM SERPL-SCNC: 4.9 MMOL/L (ref 3.5–5.3)
PROT SERPL-MCNC: 6.2 G/DL (ref 6.4–8.2)
PSA SERPL-MCNC: 0.24 NG/ML
RBC # BLD AUTO: 5.2 X10*6/UL (ref 4.5–5.9)
SODIUM SERPL-SCNC: 143 MMOL/L (ref 136–145)
TRIGL SERPL-MCNC: 94 MG/DL (ref 0–149)
VLDL: 19 MG/DL (ref 0–40)
WBC # BLD AUTO: 5.5 X10*3/UL (ref 4.4–11.3)

## 2024-05-02 PROCEDURE — 85025 COMPLETE CBC W/AUTO DIFF WBC: CPT

## 2024-05-02 PROCEDURE — 80053 COMPREHEN METABOLIC PANEL: CPT

## 2024-05-02 PROCEDURE — 84153 ASSAY OF PSA TOTAL: CPT

## 2024-05-02 PROCEDURE — 83036 HEMOGLOBIN GLYCOSYLATED A1C: CPT

## 2024-05-02 PROCEDURE — 36415 COLL VENOUS BLD VENIPUNCTURE: CPT

## 2024-05-02 PROCEDURE — 80061 LIPID PANEL: CPT

## 2024-05-02 NOTE — RESULT ENCOUNTER NOTE
Your A1c is 6, in the prediabetes range rest of lab results are stable please follow low carbohydrate diet.

## 2024-05-03 ENCOUNTER — OFFICE VISIT (OUTPATIENT)
Dept: CARDIOLOGY | Facility: CLINIC | Age: 64
End: 2024-05-03
Payer: COMMERCIAL

## 2024-05-03 VITALS
HEART RATE: 70 BPM | DIASTOLIC BLOOD PRESSURE: 80 MMHG | SYSTOLIC BLOOD PRESSURE: 128 MMHG | HEIGHT: 70 IN | WEIGHT: 210 LBS | BODY MASS INDEX: 30.06 KG/M2 | OXYGEN SATURATION: 96 %

## 2024-05-03 DIAGNOSIS — I25.10 CORONARY ARTERY DISEASE INVOLVING NATIVE CORONARY ARTERY OF NATIVE HEART WITHOUT ANGINA PECTORIS: ICD-10-CM

## 2024-05-03 DIAGNOSIS — R94.31 ABNORMAL EKG: Primary | ICD-10-CM

## 2024-05-03 DIAGNOSIS — E78.49 OTHER HYPERLIPIDEMIA: ICD-10-CM

## 2024-05-03 DIAGNOSIS — I77.810 ASCENDING AORTA DILATATION (CMS-HCC): ICD-10-CM

## 2024-05-03 DIAGNOSIS — R06.09 EXERTIONAL DYSPNEA: ICD-10-CM

## 2024-05-03 PROBLEM — R93.1 AGATSTON CORONARY ARTERY CALCIUM SCORE BETWEEN 200 AND 399: Status: RESOLVED | Noted: 2023-04-10 | Resolved: 2024-05-03

## 2024-05-03 PROCEDURE — 1036F TOBACCO NON-USER: CPT | Performed by: INTERNAL MEDICINE

## 2024-05-03 PROCEDURE — 99203 OFFICE O/P NEW LOW 30 MIN: CPT | Performed by: INTERNAL MEDICINE

## 2024-05-03 PROCEDURE — 3008F BODY MASS INDEX DOCD: CPT | Performed by: INTERNAL MEDICINE

## 2024-05-03 ASSESSMENT — ENCOUNTER SYMPTOMS: DYSPNEA ON EXERTION: 1

## 2024-05-03 NOTE — PROGRESS NOTES
"Subjective   Torsten Sheehan is a 63 y.o. male.    Chief Complaint:  Cardiac evaluation for coronary artery disease, dilated ascending aorta.  Fatigue and exertional dyspnea.    HPI    This is a 63-year-old gentleman who presents for cardiac evaluation.  The patient has symptoms of mild exertional dyspnea and generalized fatigue.  Denies chest discomfort or chest pressure.    His cardiac history is significant for coronary artery disease based on a positive CT calcium score of 226.  This is consistent with the presence of mild to moderate atherosclerotic coronary artery disease.    Patient has a mildly dilated ascending aorta at 4.0 cm.    Past medical history: Significant for hernia repair and a lymph node biopsy.  He has borderline diabetes.  History of hyperlipidemia.    Allergies to medications: None known    Social history: Former smoker, quit in 2005.    Family history: Mother had myocardial infarction's in her 70s but lived to .  Father had coronary disease diabetes and bladder cancer.    Review of Systems   Constitutional: Positive for malaise/fatigue.   Cardiovascular:  Positive for dyspnea on exertion.       Visit Vitals  /80 (BP Location: Left arm)   Pulse 70   Ht 1.778 m (5' 10\")   Wt 95.3 kg (210 lb)   SpO2 96%   BMI 30.13 kg/m²   Smoking Status Former   BSA 2.17 m²        Objective     Constitutional:       Appearance: Not in distress.   Neck:      Vascular: JVD normal.   Pulmonary:      Breath sounds: Normal breath sounds.   Cardiovascular:      Normal rate. Regular rhythm. S1 with normal intensity. S2 with normal intensity.       Murmurs: There is no murmur.      No gallop.    Pulses:     Intact distal pulses.   Edema:     Peripheral edema absent.   Abdominal:      General: Bowel sounds are normal.   Neurological:      Mental Status: Alert and oriented to person, place and time.         Lab Review:   Lab Results   Component Value Date     05/02/2024    K 4.9 05/02/2024     05/02/2024 "    CO2 28 05/02/2024    BUN 14 05/02/2024    CREATININE 1.03 05/02/2024    GLUCOSE 99 05/02/2024    CALCIUM 9.2 05/02/2024     Lab Results   Component Value Date    CHOL 150 05/02/2024    TRIG 94 05/02/2024    HDL 65.8 05/02/2024       Assessment:    1.  Coronary artery disease.  Mild by CT calcium scoring.  Estimated risk of myocardial infarction is about 1.5 %/year.  He is asymptomatic.    2.  Dilated ascending aorta.  We have recommended an echocardiographic evaluation.  No indication for CT angiography.  Given the size of his ascending aorta this could be his normal.  General recommendation follow-up imaging study every 3 years.  The echocardiogram does see the ascending aorta quite well and avoids radiation exposure.    3.  Hyperlipidemia.  Cholesterol 150, HDL 65, LDL 65.  I agree with using some lipid therapy to get his LDL down to 50.    We discussed all our findings with the patient and his wife.  We will see him in follow-up after his echocardiographic study.

## 2024-05-06 ENCOUNTER — HOSPITAL ENCOUNTER (OUTPATIENT)
Dept: CARDIOLOGY | Facility: CLINIC | Age: 64
Discharge: HOME | End: 2024-05-06
Payer: COMMERCIAL

## 2024-05-06 DIAGNOSIS — R06.09 EXERTIONAL DYSPNEA: ICD-10-CM

## 2024-05-06 DIAGNOSIS — R94.31 ABNORMAL EKG: ICD-10-CM

## 2024-05-06 DIAGNOSIS — I25.10 CORONARY ARTERY DISEASE INVOLVING NATIVE CORONARY ARTERY OF NATIVE HEART WITHOUT ANGINA PECTORIS: ICD-10-CM

## 2024-05-06 DIAGNOSIS — I77.810 ASCENDING AORTA DILATATION (CMS-HCC): ICD-10-CM

## 2024-05-06 PROCEDURE — 93306 TTE W/DOPPLER COMPLETE: CPT

## 2024-05-06 PROCEDURE — 93306 TTE W/DOPPLER COMPLETE: CPT | Performed by: INTERNAL MEDICINE

## 2024-05-07 LAB
AORTIC VALVE MEAN GRADIENT: 4.1 MMHG
AORTIC VALVE PEAK VELOCITY: 1.55 M/S
AV PEAK GRADIENT: 9.6 MMHG
AVA (PEAK VEL): 2.12 CM2
AVA (VTI): 2.11 CM2
EJECTION FRACTION APICAL 4 CHAMBER: 60.9
LEFT VENTRICLE INTERNAL DIMENSION DIASTOLE: 5.19 CM (ref 3.5–6)
LEFT VENTRICULAR OUTFLOW TRACT DIAMETER: 2.04 CM
LV EJECTION FRACTION BIPLANE: 53 %
RIGHT VENTRICLE FREE WALL PEAK S': 0.12 CM/S
TRICUSPID ANNULAR PLANE SYSTOLIC EXCURSION: 2 CM

## 2024-05-17 PROBLEM — L81.4 LENTIGINOSIS: Status: ACTIVE | Noted: 2024-05-17

## 2024-05-17 PROBLEM — L82.1 SEBORRHEIC KERATOSIS: Status: ACTIVE | Noted: 2024-05-17

## 2024-05-17 PROBLEM — D18.01 HEMANGIOMA OF SKIN: Status: ACTIVE | Noted: 2024-05-17

## 2024-06-06 ENCOUNTER — OFFICE VISIT (OUTPATIENT)
Dept: CARDIOLOGY | Facility: CLINIC | Age: 64
End: 2024-06-06
Payer: COMMERCIAL

## 2024-06-06 VITALS
OXYGEN SATURATION: 96 % | SYSTOLIC BLOOD PRESSURE: 128 MMHG | WEIGHT: 209.4 LBS | DIASTOLIC BLOOD PRESSURE: 70 MMHG | HEART RATE: 91 BPM | BODY MASS INDEX: 29.31 KG/M2 | HEIGHT: 71 IN

## 2024-06-06 DIAGNOSIS — R93.1 ABNORMAL ECHOCARDIOGRAM: Primary | ICD-10-CM

## 2024-06-06 DIAGNOSIS — R06.09 EXERTIONAL DYSPNEA: ICD-10-CM

## 2024-06-06 DIAGNOSIS — I25.119 CORONARY ARTERY DISEASE INVOLVING NATIVE CORONARY ARTERY OF NATIVE HEART WITH ANGINA PECTORIS (CMS-HCC): ICD-10-CM

## 2024-06-06 DIAGNOSIS — I77.810 ASCENDING AORTA DILATATION (CMS-HCC): ICD-10-CM

## 2024-06-06 DIAGNOSIS — E78.49 OTHER HYPERLIPIDEMIA: ICD-10-CM

## 2024-06-06 DIAGNOSIS — R94.31 ABNORMAL EKG: ICD-10-CM

## 2024-06-06 PROBLEM — I25.10 CORONARY ARTERY DISEASE INVOLVING NATIVE CORONARY ARTERY OF NATIVE HEART WITHOUT ANGINA PECTORIS: Status: RESOLVED | Noted: 2024-05-03 | Resolved: 2024-06-06

## 2024-06-06 PROCEDURE — 99213 OFFICE O/P EST LOW 20 MIN: CPT | Performed by: INTERNAL MEDICINE

## 2024-06-06 PROCEDURE — 1036F TOBACCO NON-USER: CPT | Performed by: INTERNAL MEDICINE

## 2024-06-06 PROCEDURE — 3008F BODY MASS INDEX DOCD: CPT | Performed by: INTERNAL MEDICINE

## 2024-06-06 RX ORDER — METOPROLOL SUCCINATE 25 MG/1
25 TABLET, EXTENDED RELEASE ORAL DAILY
Qty: 90 TABLET | Refills: 3 | Status: SHIPPED | OUTPATIENT
Start: 2024-06-06 | End: 2025-06-06

## 2024-06-06 ASSESSMENT — ENCOUNTER SYMPTOMS: DYSPNEA ON EXERTION: 1

## 2024-06-06 NOTE — PROGRESS NOTES
Subjective   Torsten Sheehan is a 64 y.o. male.    Chief Complaint:  Follow-up echocardiographic study.    HPI    On his last visit he presented to us with symptoms of fatigue and exertional dyspnea.  We performed an echocardiographic study.  He is here for follow-up of the results.    His cardiac history is significant for coronary artery disease based on a positive CT calcium score of 226.  This is consistent with the presence of mild to moderate atherosclerotic coronary artery disease.     Patient has a mildly dilated ascending aorta at 4.0 cm.     Past medical history: Significant for hernia repair and a lymph node biopsy.  He has borderline diabetes.  History of hyperlipidemia.     Allergies to medications: None known     Social history: Former smoker, quit in 2005.     Family history: Mother had myocardial infarction's in her 70s but lived to .  Father had coronary disease diabetes and bladder cancer.       Review of Systems   Constitutional: Positive for malaise/fatigue.   Cardiovascular:  Positive for dyspnea on exertion.   Musculoskeletal:  Positive for arthritis.   All other systems reviewed and are negative.      Current Outpatient Medications   Medication Sig Dispense Refill    albuterol 90 mcg/actuation inhaler Inhale 2 puffs every 6 hours if needed for wheezing. 18 g 11    calcium carbonate (SUPER CALCIUM ORAL) Take 1 tablet by mouth once daily. INCLUDES VITAMIN D, C, AND ZINC IN 1 TABLET      rosuvastatin (Crestor) 5 mg tablet Take 1 tablet (5 mg) by mouth once daily. 100 tablet 3    metoprolol succinate XL (Toprol-XL) 25 mg 24 hr tablet Take 1 tablet (25 mg) by mouth once daily. Do not crush or chew. 90 tablet 3    sildenafil (Viagra) 100 mg tablet Take 1 tablet (100 mg) by mouth once daily as needed for erectile dysfunction. 12 tablet 3     No current facility-administered medications for this visit.        Visit Vitals  /70 (BP Location: Left arm, Patient Position: Sitting, BP Cuff Size:  "Adult)   Pulse 91   Ht 1.803 m (5' 11\")   Wt 95 kg (209 lb 6.4 oz)   SpO2 96%   BMI 29.21 kg/m²   Smoking Status Former   BSA 2.18 m²        Objective     Constitutional:       Appearance: Not in distress.   Neck:      Vascular: JVD normal.   Pulmonary:      Breath sounds: Normal breath sounds.   Cardiovascular:      Normal rate. Regular rhythm. S1 with normal intensity. S2 with normal intensity.       Murmurs: There is no murmur.      No gallop.    Pulses:     Intact distal pulses.   Edema:     Peripheral edema absent.   Abdominal:      General: Bowel sounds are normal.   Neurological:      Mental Status: Alert and oriented to person, place and time.         Lab Review:   Lab Results   Component Value Date     05/02/2024    K 4.9 05/02/2024     05/02/2024    CO2 28 05/02/2024    BUN 14 05/02/2024    CREATININE 1.03 05/02/2024    GLUCOSE 99 05/02/2024    CALCIUM 9.2 05/02/2024     Lab Results   Component Value Date    CHOL 150 05/02/2024    TRIG 94 05/02/2024    HDL 65.8 05/02/2024       Assessment:    1.  Coronary disease.  The patient's baseline echocardiographic study shows inferior wall hypokinesis with mildly reduced left ventricular function.  There is concern for prior acute coronary event.  Our plan is to perform a stress echo study.    2.  Dilated ascending aorta.  Measures about 3.85 cm on his most recent echocardiographic study.    3.  Hyperlipidemia.  LDL is 65.  Given his age we may want to get his LDL down to 50.    We will see him after the stress echo study and go over the results  "

## 2024-06-24 ENCOUNTER — TELEPHONE (OUTPATIENT)
Dept: CARDIOLOGY | Facility: CLINIC | Age: 64
End: 2024-06-24
Payer: COMMERCIAL

## 2024-06-24 NOTE — TELEPHONE ENCOUNTER
Pt called because he is scheduled for a stress echo and ov on 7/3/24. Pt will need to reschedule. Informed pt will have Pilar, Dr. Denise's  call him to r/s since he is having a stress w/ov.

## 2024-07-03 ENCOUNTER — APPOINTMENT (OUTPATIENT)
Dept: CARDIOLOGY | Facility: CLINIC | Age: 64
End: 2024-07-03
Payer: COMMERCIAL

## 2024-07-03 ENCOUNTER — HOSPITAL ENCOUNTER (OUTPATIENT)
Dept: CARDIOLOGY | Facility: CLINIC | Age: 64
Discharge: HOME | End: 2024-07-03
Payer: COMMERCIAL

## 2024-07-03 VITALS
BODY MASS INDEX: 29.26 KG/M2 | DIASTOLIC BLOOD PRESSURE: 80 MMHG | HEIGHT: 71 IN | HEART RATE: 82 BPM | SYSTOLIC BLOOD PRESSURE: 130 MMHG | WEIGHT: 209 LBS

## 2024-07-03 DIAGNOSIS — R94.31 ABNORMAL EKG: ICD-10-CM

## 2024-07-03 DIAGNOSIS — I25.119 CORONARY ARTERY DISEASE INVOLVING NATIVE CORONARY ARTERY OF NATIVE HEART WITH ANGINA PECTORIS (CMS-HCC): Primary | ICD-10-CM

## 2024-07-03 DIAGNOSIS — I77.810 ASCENDING AORTA DILATATION (CMS-HCC): ICD-10-CM

## 2024-07-03 DIAGNOSIS — E78.49 OTHER HYPERLIPIDEMIA: ICD-10-CM

## 2024-07-03 DIAGNOSIS — R93.1 AGATSTON CAC SCORE 200-399: ICD-10-CM

## 2024-07-03 DIAGNOSIS — I25.10 ATHEROSCLEROTIC HEART DISEASE OF NATIVE CORONARY ARTERY WITHOUT ANGINA PECTORIS: ICD-10-CM

## 2024-07-03 DIAGNOSIS — R93.1 ABNORMAL ECHOCARDIOGRAM: ICD-10-CM

## 2024-07-03 DIAGNOSIS — I25.119 CORONARY ARTERY DISEASE INVOLVING NATIVE CORONARY ARTERY OF NATIVE HEART WITH ANGINA PECTORIS (CMS-HCC): ICD-10-CM

## 2024-07-03 DIAGNOSIS — R06.09 EXERTIONAL DYSPNEA: ICD-10-CM

## 2024-07-03 PROBLEM — R00.2 PALPITATIONS: Status: ACTIVE | Noted: 2024-07-03

## 2024-07-03 PROCEDURE — 93350 STRESS TTE ONLY: CPT | Performed by: INTERNAL MEDICINE

## 2024-07-03 PROCEDURE — 99213 OFFICE O/P EST LOW 20 MIN: CPT | Performed by: INTERNAL MEDICINE

## 2024-07-03 PROCEDURE — 93016 CV STRESS TEST SUPVJ ONLY: CPT | Performed by: INTERNAL MEDICINE

## 2024-07-03 PROCEDURE — 3008F BODY MASS INDEX DOCD: CPT | Performed by: INTERNAL MEDICINE

## 2024-07-03 PROCEDURE — 1036F TOBACCO NON-USER: CPT | Performed by: INTERNAL MEDICINE

## 2024-07-03 PROCEDURE — 93017 CV STRESS TEST TRACING ONLY: CPT

## 2024-07-03 PROCEDURE — 93018 CV STRESS TEST I&R ONLY: CPT | Performed by: INTERNAL MEDICINE

## 2024-07-03 RX ORDER — ROSUVASTATIN CALCIUM 10 MG/1
10 TABLET, COATED ORAL DAILY
Qty: 90 TABLET | Refills: 3 | Status: SHIPPED | OUTPATIENT
Start: 2024-07-03 | End: 2025-07-03

## 2024-07-03 RX ORDER — DILTIAZEM HYDROCHLORIDE 120 MG/1
120 CAPSULE, COATED, EXTENDED RELEASE ORAL DAILY
Qty: 90 CAPSULE | Refills: 3 | Status: SHIPPED | OUTPATIENT
Start: 2024-07-03 | End: 2025-07-03

## 2024-07-03 NOTE — PATIENT INSTRUCTIONS
Your stress test and echo images are normal.    No restrictions on your activities.    Stop Metoprolol    Start diltiazem  mg daily    Increase the rosuvastatin to 10 mg daily.

## 2024-08-09 ENCOUNTER — APPOINTMENT (OUTPATIENT)
Dept: PRIMARY CARE | Facility: CLINIC | Age: 64
End: 2024-08-09
Payer: COMMERCIAL

## 2024-08-09 VITALS — SYSTOLIC BLOOD PRESSURE: 123 MMHG | DIASTOLIC BLOOD PRESSURE: 78 MMHG | HEART RATE: 64 BPM

## 2024-08-09 DIAGNOSIS — R73.01 IMPAIRED FASTING GLUCOSE: ICD-10-CM

## 2024-08-09 DIAGNOSIS — E78.49 OTHER HYPERLIPIDEMIA: Primary | ICD-10-CM

## 2024-08-09 DIAGNOSIS — I77.810 ASCENDING AORTA DILATATION (CMS-HCC): ICD-10-CM

## 2024-08-09 DIAGNOSIS — I25.119 CORONARY ARTERY DISEASE INVOLVING NATIVE CORONARY ARTERY OF NATIVE HEART WITH ANGINA PECTORIS (CMS-HCC): ICD-10-CM

## 2024-08-09 DIAGNOSIS — E66.09 CLASS 1 OBESITY DUE TO EXCESS CALORIES WITHOUT SERIOUS COMORBIDITY WITH BODY MASS INDEX (BMI) OF 30.0 TO 30.9 IN ADULT: ICD-10-CM

## 2024-08-09 DIAGNOSIS — R00.2 PALPITATIONS: ICD-10-CM

## 2024-08-09 PROCEDURE — 1036F TOBACCO NON-USER: CPT | Performed by: INTERNAL MEDICINE

## 2024-08-09 PROCEDURE — 99214 OFFICE O/P EST MOD 30 MIN: CPT | Performed by: INTERNAL MEDICINE

## 2024-08-09 ASSESSMENT — ENCOUNTER SYMPTOMS
RESPIRATORY NEGATIVE: 1
DYSURIA: 0
CHEST TIGHTNESS: 0
DIFFICULTY URINATING: 0
SHORTNESS OF BREATH: 0
DIZZINESS: 0
DIARRHEA: 0
HEADACHES: 0
EYES NEGATIVE: 1
HEMATOLOGIC/LYMPHATIC NEGATIVE: 1
ABDOMINAL PAIN: 0
UNEXPECTED WEIGHT CHANGE: 0
BLOOD IN STOOL: 0
COUGH: 0
FATIGUE: 0
PSYCHIATRIC NEGATIVE: 1
NAUSEA: 0
ENDOCRINE NEGATIVE: 1
NEUROLOGICAL NEGATIVE: 1
GASTROINTESTINAL NEGATIVE: 1

## 2024-08-09 NOTE — ASSESSMENT & PLAN NOTE
Patient was seen by cardiologist and had a stress echo.  He denied any chest pain.  Continue statin.

## 2024-08-09 NOTE — ASSESSMENT & PLAN NOTE
Improving with diet and exercise he is now 29.25 on his BMI.  Encouraged to continue weight reduction.   SUBJECTIVE:   CC: Hunter Dill is an 63 year old male who presents for preventative health visit.  Patient presents for preventative health care visit.  Overall doing well no chest pain shortness of breath bowel or urinary symptoms.  Mentions that it has been years since he has been to the doctor and is for this reason he presents.      Patient has been advised of split billing requirements and indicates understanding: Yes  Healthy Habits:     Getting at least 3 servings of Calcium per day:  NO    Bi-annual eye exam:  NO    Dental care twice a year:  Yes    Sleep apnea or symptoms of sleep apnea:  Sleep apnea    Diet:  Regular (no restrictions)    Frequency of exercise:  1 day/week    Duration of exercise:  15-30 minutes    Taking medications regularly:  Not Applicable    Barriers to taking medications:  Not applicable    Medication side effects:  Not applicable    PHQ-2 Total Score: 0    Additional concerns today:  No      Today's PHQ-2 Score:   PHQ-2 ( 1999 [employer]) 4/20/2021   Q1: Little interest or pleasure in doing things 0   Q2: Feeling down, depressed or hopeless 0   PHQ-2 Score 0       Abuse: Current or Past(Physical, Sexual or Emotional)- No  Do you feel safe in your environment? Yes    Have you ever done Advance Care Planning? (For example, a Health Directive, POLST, or a discussion with a medical provider or your loved ones about your wishes): No, advance care planning information given to patient to review.  Patient declined advance care planning discussion at this time.    Social History     Tobacco Use     Smoking status: Never Smoker     Smokeless tobacco: Never Used   Substance Use Topics     Alcohol use: Yes     Comment: 2 on wknd      If you drink alcohol do you typically have >3 drinks per day or >7 drinks per week? Yes      Alcohol Use 4/20/2021   AUDIT SCORE  6     AUDIT - Alcohol Use Disorders Identification Test - Reproduced from the World Health Organization Audit 2001 (Second Edition)  4/20/2021   1.  How often do you have a drink containing alcohol? 4 or more times a week   2.  How many drinks containing alcohol do you have on a typical day when you are drinking? 3 or 4   3.  How often do you have five or more drinks on one occasion? Less than monthly   4.  How often during the last year have you found that you were not able to stop drinking once you had started? Never   5.  How often during the last year have you failed to do what was normally expected of you because of drinking? Never   6.  How often during the last year have you needed a first drink in the morning to get yourself going after a heavy drinking session? Never   7.  How often during the last year have you had a feeling of guilt or remorse after drinking? Never   8.  How often during the last year have you been unable to remember what happened the night before because of your drinking? Never   9.  Have you or someone else been injured because of your drinking? No   10. Has a relative, friend, doctor or other health care worker been concerned about your drinking or suggested you cut down? No   TOTAL SCORE 6       Last PSA: No results found for: PSA    Reviewed orders with patient. Reviewed health maintenance and updated orders accordingly - Yes  Lab work is in process    Reviewed and updated as needed this visit by clinical staff  Tobacco  Allergies  Meds              Reviewed and updated as needed this visit by Provider                Past Medical History:   Diagnosis Date     Other cataract     Bilateral        Review of Systems  CONSTITUTIONAL: NEGATIVE for fever, chills, change in weight  INTEGUMENTARY/SKIN: NEGATIVE for worrisome rashes, moles or lesions  EYES: NEGATIVE for vision changes or irritation  ENT: NEGATIVE for ear, mouth and throat problems  RESP: NEGATIVE for significant cough or SOB  CV: NEGATIVE for chest pain, palpitations or peripheral edema  GI: NEGATIVE for nausea, abdominal pain, heartburn, or change in  "bowel habits   male: negative for dysuria, hematuria, decreased urinary stream, erectile dysfunction, urethral discharge  MUSCULOSKELETAL: NEGATIVE for significant arthralgias or myalgia  NEURO: NEGATIVE for weakness, dizziness or paresthesias  PSYCHIATRIC: NEGATIVE for changes in mood or affect    OBJECTIVE:   BP (!) 160/92 (BP Location: Right arm, Patient Position: Sitting, Cuff Size: Adult Large)   Pulse 66   Temp 96  F (35.6  C) (Temporal)   Ht 1.854 m (6' 1\")   Wt 91.3 kg (201 lb 4.8 oz)   SpO2 99%   BMI 26.56 kg/m      Physical Exam  GENERAL: healthy, alert and no distress  EYES: Eyes grossly normal to inspection, PERRL and conjunctivae and sclerae normal  HENT: ear canals and TM's normal, nose and mouth without ulcers or lesions  NECK: no adenopathy, no asymmetry, masses, or scars and thyroid normal to palpation  RESP: lungs clear to auscultation - no rales, rhonchi or wheezes  CV: regular rate and rhythm, normal S1 S2, no S3 or S4, no murmur, click or rub, no peripheral edema and peripheral pulses strong  ABDOMEN: soft, nontender, no hepatosplenomegaly, no masses and bowel sounds normal  MS: no gross musculoskeletal defects noted, no edema  SKIN: no suspicious lesions or rashes  NEURO: Normal strength and tone, mentation intact and speech normal  PSYCH: mentation appears normal, affect normal/bright  Left testicle larger than the right testicle firm smooth  Diagnostic Test Results:  Labs reviewed in Epic    ASSESSMENT/PLAN:   (Z00.00) Preventative health care  (primary encounter diagnosis)  Comment: Usual labs will be obtained.  Patient is overdue colorectal cancer screening.  Additionally he has testicular asymmetry suspect a hydrocele on the left.  Plan: GASTROENTEROLOGY ADULT REF PROCEDURE ONLY,         Comprehensive metabolic panel (BMP + Alb, Alk         Phos, ALT, AST, Total. Bili, TP), CBC with         platelets and differential, TSH with free T4         reflex, PSA, screen, Lipid panel reflex " "to         direct LDL Fasting, US Testicular & Scrotum w         [employer]              Patient has been advised of split billing requirements and indicates understanding: Yes  COUNSELING:   Reviewed preventive health counseling, as reflected in patient instructions    Estimated body mass index is 26.56 kg/m  as calculated from the following:    Height as of this encounter: 1.854 m (6' 1\").    Weight as of this encounter: 91.3 kg (201 lb 4.8 oz).         He reports that he has never smoked. He has never used smokeless tobacco.      Counseling Resources:  ATP IV Guidelines  Pooled Cohorts Equation Calculator  FRAX Risk Assessment  ICSI Preventive Guidelines  Dietary Guidelines for Americans, 2010  USDA's MyPlate  ASA Prophylaxis  Lung CA Screening    Robert Dover MD  [facility]  "

## 2024-08-09 NOTE — PROGRESS NOTES
Subjective   Patient ID: Torsten Sheehan is a 64 y.o. male who presents for Follow-up (Follow up ).    An interactive audio and video telecommunication system with patient real time communications between the patient (at the original site) and provider (at the distant site) was utilized to provide this telehealth service.  Verbal consent was requested and obtained from the patient at this date for a telehealth.  Patient is seen virtually today to follow-up on his elevated cardiac calcium score and dilated ascending aorta, since last visit he saw cardiologist and increase his rosuvastatin up to 10 mg daily and has been feeling good, he lost few pounds denies any side effects from the medication no chest pain or shortness of breath, no muscle pain nausea vomiting.  He did have stress echo with a cardiologist and was told to follow-up with him in 1 year.         Review of Systems   Constitutional:  Negative for fatigue and unexpected weight change.        Lost weight intentionally with diet and being more active.   HENT: Negative.  Negative for congestion.    Eyes: Negative.    Respiratory: Negative.  Negative for cough, chest tightness and shortness of breath.    Cardiovascular:  Negative for chest pain and leg swelling.   Gastrointestinal: Negative.  Negative for abdominal pain, blood in stool, diarrhea and nausea.   Endocrine: Negative.    Genitourinary: Negative.  Negative for difficulty urinating and dysuria.   Neurological: Negative.  Negative for dizziness and headaches.   Hematological: Negative.    Psychiatric/Behavioral: Negative.         Objective   /78   Pulse 64     Physical Exam  Constitutional:       General: He is not in acute distress.  Pulmonary:      Effort: No respiratory distress.   Neurological:      Mental Status: He is alert.         Assessment/Plan   Problem List Items Addressed This Visit             ICD-10-CM    Hyperlipidemia - Primary E78.5     Tolerating rosuvastatin well   we will  check Labs and see me back in 3 months         Relevant Orders    Lipid Panel    Hepatic Function Panel    Creatine Kinase    Impaired fasting glucose R73.01     Improving with diet and exercise.         Class 1 obesity with body mass index (BMI) of 30.0 to 30.9 in adult E66.9, Z68.30     Improving with diet and exercise he is now 29.25 on his BMI.  Encouraged to continue weight reduction.         Coronary artery disease involving native coronary artery of native heart with angina pectoris (CMS-HCC) I25.119     Patient was seen by cardiologist and had a stress echo.  He denied any chest pain.  Continue statin.         Ascending aorta dilatation (CMS-HCC) I77.810     Will monitor yearly.  Patient was seen by cardiologist.  Keep BP at goal.         Palpitations R00.2     Improving with diltiazem.

## 2024-08-13 ENCOUNTER — TELEPHONE (OUTPATIENT)
Dept: PRIMARY CARE | Facility: CLINIC | Age: 64
End: 2024-08-13
Payer: COMMERCIAL

## 2024-08-13 NOTE — TELEPHONE ENCOUNTER
----- Message from Ling ROMERO sent at 8/13/2024 10:00 AM EDT -----  Regarding: FW: 3 months appt    ----- Message -----  From: Lilia Gao MD  Sent: 8/9/2024  11:15 AM EDT  To: Ling Pro MA  Subject: 3 months appt                                    Please schedule 3 month follow-up appointment to see me in November.

## 2024-09-11 ENCOUNTER — TELEMEDICINE (OUTPATIENT)
Dept: PRIMARY CARE | Facility: CLINIC | Age: 64
End: 2024-09-11
Payer: COMMERCIAL

## 2024-09-11 DIAGNOSIS — L03.116 CELLULITIS OF LEFT LOWER EXTREMITY: Primary | ICD-10-CM

## 2024-09-11 PROCEDURE — 99213 OFFICE O/P EST LOW 20 MIN: CPT | Performed by: NURSE PRACTITIONER

## 2024-09-11 NOTE — ASSESSMENT & PLAN NOTE
Bactrim x 7 days.  Advised 1 week follow up. May need longer course of ABX.  Advised to call sooner if symptoms worsening after being on ABX for 48 hours.

## 2024-09-11 NOTE — PROGRESS NOTES
Subjective   Patient ID: Torsten Sheehan is a 64 y.o. male who presents for No chief complaint on file..    Patient of Dr. Gao.    He had to go to the ER last night for left leg swelling. Went to [facility] ER. Had XR and ultrasound and some blood work. Was determined to have cellulitis. Was started on IV ABX and sent home with Bactrim DS x 7 days. Has some redness but no break in the skin. He was recently up in [place] and drove there.     135/87 BP today at home.          Review of Systems  otherwise negative aside from what was mentioned above in HPI.    Objective   There were no vitals taken for this visit.    Physical Exam  Constitutional:       Appearance: Normal appearance.   Skin:     Comments: Left leg slightly red and swollen   Neurological:      Mental Status: He is alert.     This visit was completed via telephone/virtual visit. All issues as documented below were discussed and addressed but no physical exam was performed. If it was felt that the patient should be evaluated via  face-to-face office appointment(s) they were directed there. The patient verbally consented to this visit.      Assessment/Plan   Problem List Items Addressed This Visit             ICD-10-CM    Cellulitis of left lower extremity - Primary L03.116     Bactrim x 7 days.  Advised 1 week follow up. May need longer course of ABX.  Advised to call sooner if symptoms worsening after being on ABX for 48 hours.

## 2024-09-12 ENCOUNTER — TELEPHONE (OUTPATIENT)
Dept: PRIMARY CARE | Facility: CLINIC | Age: 64
End: 2024-09-12
Payer: COMMERCIAL

## 2024-09-12 NOTE — TELEPHONE ENCOUNTER
Pt called stated saw Bianca mullen v.visit yesterday and had about 5 follow up questions.    Please call pt at [phone]

## 2024-09-13 ENCOUNTER — OFFICE VISIT (OUTPATIENT)
Dept: PRIMARY CARE | Facility: CLINIC | Age: 64
End: 2024-09-13
Payer: COMMERCIAL

## 2024-09-13 VITALS
DIASTOLIC BLOOD PRESSURE: 80 MMHG | WEIGHT: 203.2 LBS | TEMPERATURE: 97.8 F | OXYGEN SATURATION: 97 % | HEART RATE: 64 BPM | BODY MASS INDEX: 28.34 KG/M2 | SYSTOLIC BLOOD PRESSURE: 141 MMHG

## 2024-09-13 DIAGNOSIS — L03.116 CELLULITIS OF LEFT LOWER EXTREMITY: Primary | ICD-10-CM

## 2024-09-13 PROCEDURE — 99213 OFFICE O/P EST LOW 20 MIN: CPT | Performed by: INTERNAL MEDICINE

## 2024-09-13 RX ORDER — DOXYCYCLINE 100 MG/1
100 CAPSULE ORAL 2 TIMES DAILY
Qty: 14 CAPSULE | Refills: 0 | Status: SHIPPED | OUTPATIENT
Start: 2024-09-13 | End: 2024-09-20

## 2024-09-13 RX ORDER — SULFAMETHOXAZOLE AND TRIMETHOPRIM 800; 160 MG/1; MG/1
TABLET ORAL
COMMUNITY
Start: 2024-09-10 | End: 2024-09-17

## 2024-09-13 ASSESSMENT — PATIENT HEALTH QUESTIONNAIRE - PHQ9
2. FEELING DOWN, DEPRESSED OR HOPELESS: NOT AT ALL
1. LITTLE INTEREST OR PLEASURE IN DOING THINGS: NOT AT ALL
SUM OF ALL RESPONSES TO PHQ9 QUESTIONS 1 AND 2: 0

## 2024-09-13 NOTE — PROGRESS NOTES
"Subjective   Patient ID: Torsten Sheehan is a 64 y.o. male who presents for Questions (Patient is here for questions regarding cellulitis diagnosis. ).    Patient seen for follow-up on left lower leg cellulitis, he was seen on 9/10 in the ER due to worsening redness and swelling of left lower leg he had XRAY,negative duplex scan for DVT, had some labs which were reviewed with patient denies any fever or chills , he received IV antibiotic and was sent home on Bactrim he saw the nurse practitioner 2 days ago, advised to continue antibiotic, he still have any question has not noticed any improvement with the redness, he has been working out in the yard and not elevating his leg.  The area feel like\" burning\".  He has many questions about his activity and whether he can go on vacation next week, driving his car.  He has been seen by cardiologist Dr. Denise for his CAD,Dx  by abnormal cardiac calcium score in the 200's, and dilated ascending aorta,at 4 cm.he had stress test recently.         Review of Systems   Constitutional:  Negative for fatigue and unexpected weight change.   HENT: Negative.  Negative for congestion.    Eyes: Negative.    Respiratory: Negative.  Negative for cough, chest tightness and shortness of breath.    Cardiovascular:  Negative for chest pain and leg swelling.   Gastrointestinal: Negative.  Negative for abdominal pain, blood in stool, diarrhea and nausea.   Endocrine: Negative.    Genitourinary: Negative.  Negative for difficulty urinating and dysuria.   Skin:         As HPI   Neurological: Negative.  Negative for dizziness and headaches.   Hematological: Negative.    Psychiatric/Behavioral: Negative.         Objective   /80 (BP Location: Left arm, Patient Position: Sitting, BP Cuff Size: Adult)   Pulse 64   Temp 36.6 °C (97.8 °F) (Temporal)   Wt 92.2 kg (203 lb 3.2 oz)   SpO2 97%   BMI 28.34 kg/m²     Physical Exam  Vitals reviewed.   Constitutional:       Appearance: Normal appearance. "   HENT:      Head: Normocephalic and atraumatic.   Eyes:      Extraocular Movements: Extraocular movements intact.      Pupils: Pupils are equal, round, and reactive to light.   Cardiovascular:      Rate and Rhythm: Normal rate and regular rhythm.      Heart sounds: Normal heart sounds.   Pulmonary:      Effort: Pulmonary effort is normal. No respiratory distress.      Breath sounds: Normal breath sounds. No wheezing or rhonchi.   Abdominal:      General: Abdomen is flat. Bowel sounds are normal.      Palpations: Abdomen is soft.      Tenderness: There is no abdominal tenderness.   Musculoskeletal:      Cervical back: Normal range of motion and neck supple.      Comments: Left leg: Noted localized erythema swelling and warmth lower medial calf, good posterior tibial pulse, noted few excoriation upper calf and lower thigh area.   Skin:     General: Skin is warm and dry.   Neurological:      General: No focal deficit present.      Mental Status: He is alert and oriented to person, place, and time.   Psychiatric:         Mood and Affect: Mood normal.         Behavior: Behavior normal.         Assessment/Plan   Problem List Items Addressed This Visit             ICD-10-CM    Cellulitis of left lower extremity - Primary L03.116     Advised patient to keep leg elevated .  apply warm compresses.  .Will add doxycycline  Call in 4 days if no improvement.  If improve is allowed to travel next week for a planned vacation.  All questions were answered, I advised him not to work on his yard for the weekend or cut the grass that should delay healing of the infection.  Advised patient to add probiotic while on antibiotic.         Relevant Medications    doxycycline (Vibramycin) 100 mg capsule

## 2024-09-14 ASSESSMENT — ENCOUNTER SYMPTOMS
HEMATOLOGIC/LYMPHATIC NEGATIVE: 1
EYES NEGATIVE: 1
BLOOD IN STOOL: 0
ABDOMINAL PAIN: 0
DYSURIA: 0
HEADACHES: 0
RESPIRATORY NEGATIVE: 1
SHORTNESS OF BREATH: 0
DIFFICULTY URINATING: 0
DIARRHEA: 0
CHEST TIGHTNESS: 0
NAUSEA: 0
COUGH: 0
PSYCHIATRIC NEGATIVE: 1
DIZZINESS: 0
ROS SKIN COMMENTS: AS HPI
GASTROINTESTINAL NEGATIVE: 1
NEUROLOGICAL NEGATIVE: 1
FATIGUE: 0
UNEXPECTED WEIGHT CHANGE: 0
ENDOCRINE NEGATIVE: 1

## 2024-09-15 NOTE — ASSESSMENT & PLAN NOTE
Advised patient to keep leg elevated .  apply warm compresses.  .Will add doxycycline  Call in 4 days if no improvement.  If improve is allowed to travel next week for a planned vacation.  All questions were answered, I advised him not to work on his yard for the weekend or cut the grass that should delay healing of the infection.  Advised patient to add probiotic while on antibiotic.

## 2024-09-16 ENCOUNTER — TELEPHONE (OUTPATIENT)
Dept: PRIMARY CARE | Facility: CLINIC | Age: 64
End: 2024-09-16
Payer: COMMERCIAL

## 2024-09-16 DIAGNOSIS — L03.116 CELLULITIS OF LEFT LOWER EXTREMITY: Primary | ICD-10-CM

## 2024-09-16 RX ORDER — AMOXICILLIN AND CLAVULANATE POTASSIUM 875; 125 MG/1; MG/1
875 TABLET, FILM COATED ORAL 2 TIMES DAILY
Qty: 20 TABLET | Refills: 0 | Status: SHIPPED | OUTPATIENT
Start: 2024-09-16 | End: 2024-09-26

## 2024-09-16 NOTE — TELEPHONE ENCOUNTER
Patient advised he has  no fever foot  was swollen from cellulitis redness is still in the [place] on ankle around left foot.     9/16 Called patient back he stated he there is no improvement. Still has  swelling on ankle he said when he presses on swollen area there is an indentation.    Patient did advise that he was at a concert Saturday and a wedding on Sunday    Er put patient on Bactrim and had 2 doses of Vancomycin   Dr. Gao prescribed Doxycycline

## 2024-09-16 NOTE — TELEPHONE ENCOUNTER
With dual coverage of MRSA of bactrim and doxy and slight improvement. Stop bactrim and add augmentin with doxy  Followup 1 week  If no improvement, will need ER and admission for IV antibitotics

## 2024-09-23 ENCOUNTER — APPOINTMENT (OUTPATIENT)
Dept: PRIMARY CARE | Facility: CLINIC | Age: 64
End: 2024-09-23
Payer: COMMERCIAL

## 2024-09-23 VITALS
DIASTOLIC BLOOD PRESSURE: 78 MMHG | HEART RATE: 69 BPM | SYSTOLIC BLOOD PRESSURE: 118 MMHG | BODY MASS INDEX: 29.79 KG/M2 | HEIGHT: 71 IN | OXYGEN SATURATION: 98 % | WEIGHT: 212.8 LBS

## 2024-09-23 DIAGNOSIS — L03.116 CELLULITIS OF LEFT LOWER EXTREMITY: Primary | ICD-10-CM

## 2024-09-23 PROCEDURE — 3008F BODY MASS INDEX DOCD: CPT | Performed by: NURSE PRACTITIONER

## 2024-09-23 PROCEDURE — 99213 OFFICE O/P EST LOW 20 MIN: CPT | Performed by: NURSE PRACTITIONER

## 2024-09-23 NOTE — PROGRESS NOTES
"Subjective   Patient ID: Torsten Sheehan is a 64 y.o. male who presents for Follow-up (Patient is following up with cellulitis of left leg. Patient currently taking Augmentin and finished doxycycline and Bactrim. ).    Presents for follow up on left lower leg cellulitis.  Completed course of Bactrim followed by Doxycycline and then the additional of Augmentin. Has 3 days left of augmentin.   Feeling less pain and swelling in leg.  No breaks in the skin.  At [facility] he had negative US DVT rule out per patient and his lactate was high.         Review of Systems  otherwise negative aside from what was mentioned above in HPI.    Objective   /78 (BP Location: Left arm, Patient Position: Sitting, BP Cuff Size: Adult)   Pulse 69   Ht 1.803 m (5' 11\")   Wt 96.5 kg (212 lb 12.8 oz)   SpO2 98%   BMI 29.68 kg/m²     Physical Exam  Constitutional:       Appearance: Normal appearance.   Cardiovascular:      Rate and Rhythm: Normal rate and regular rhythm.   Pulmonary:      Effort: Pulmonary effort is normal.      Breath sounds: Normal breath sounds.   Abdominal:      General: Abdomen is flat. Bowel sounds are normal.   Neurological:      Mental Status: He is alert.   Psychiatric:         Mood and Affect: Mood normal.         Behavior: Behavior normal.         Thought Content: Thought content normal.         Judgment: Judgment normal.         Assessment/Plan   Problem List Items Addressed This Visit             ICD-10-CM    Cellulitis of left lower extremity - Primary L03.116     Resolving.  Complete Augmentin.  Advised to follow up if symptoms start to worsen again.  Wear compression sock to help last bit of swelling. Elevate legs, avoid salt stay active.               "

## 2024-09-23 NOTE — ASSESSMENT & PLAN NOTE
Resolving.  Complete Augmentin.  Advised to follow up if symptoms start to worsen again.  Wear compression sock to help last bit of swelling. Elevate legs, avoid salt stay active.

## 2024-11-21 ENCOUNTER — LAB (OUTPATIENT)
Dept: LAB | Facility: LAB | Age: 64
End: 2024-11-21
Payer: COMMERCIAL

## 2024-11-21 DIAGNOSIS — E78.49 OTHER HYPERLIPIDEMIA: ICD-10-CM

## 2024-11-21 LAB
ALBUMIN SERPL BCP-MCNC: 4.5 G/DL (ref 3.4–5)
ALP SERPL-CCNC: 60 U/L (ref 33–136)
ALT SERPL W P-5'-P-CCNC: 65 U/L (ref 10–52)
AST SERPL W P-5'-P-CCNC: 35 U/L (ref 9–39)
BILIRUB DIRECT SERPL-MCNC: 0.1 MG/DL (ref 0–0.3)
BILIRUB SERPL-MCNC: 0.7 MG/DL (ref 0–1.2)
CHOLEST SERPL-MCNC: 157 MG/DL (ref 0–199)
CHOLESTEROL/HDL RATIO: 2.2
CK SERPL-CCNC: 76 U/L (ref 0–325)
HDLC SERPL-MCNC: 71.8 MG/DL
LDLC SERPL CALC-MCNC: 64 MG/DL
NON HDL CHOLESTEROL: 85 MG/DL (ref 0–149)
PROT SERPL-MCNC: 6.9 G/DL (ref 6.4–8.2)
TRIGL SERPL-MCNC: 104 MG/DL (ref 0–149)
VLDL: 21 MG/DL (ref 0–40)

## 2024-11-21 PROCEDURE — 36415 COLL VENOUS BLD VENIPUNCTURE: CPT

## 2024-11-21 PROCEDURE — 82550 ASSAY OF CK (CPK): CPT

## 2024-11-21 PROCEDURE — 80061 LIPID PANEL: CPT

## 2024-11-21 PROCEDURE — 80076 HEPATIC FUNCTION PANEL: CPT

## 2024-11-25 ENCOUNTER — APPOINTMENT (OUTPATIENT)
Dept: PRIMARY CARE | Facility: CLINIC | Age: 64
End: 2024-11-25
Payer: COMMERCIAL

## 2024-11-25 VITALS
DIASTOLIC BLOOD PRESSURE: 80 MMHG | WEIGHT: 206.6 LBS | HEART RATE: 62 BPM | OXYGEN SATURATION: 98 % | SYSTOLIC BLOOD PRESSURE: 130 MMHG | BODY MASS INDEX: 28.81 KG/M2 | TEMPERATURE: 98 F

## 2024-11-25 DIAGNOSIS — R73.01 IMPAIRED FASTING GLUCOSE: ICD-10-CM

## 2024-11-25 DIAGNOSIS — E66.811 CLASS 1 OBESITY DUE TO EXCESS CALORIES WITHOUT SERIOUS COMORBIDITY WITH BODY MASS INDEX (BMI) OF 30.0 TO 30.9 IN ADULT: ICD-10-CM

## 2024-11-25 DIAGNOSIS — D12.6 TUBULAR ADENOMA OF COLON: ICD-10-CM

## 2024-11-25 DIAGNOSIS — I77.810 ASCENDING AORTA DILATATION (CMS-HCC): ICD-10-CM

## 2024-11-25 DIAGNOSIS — B86 SCABIES: ICD-10-CM

## 2024-11-25 DIAGNOSIS — E78.49 OTHER HYPERLIPIDEMIA: Primary | ICD-10-CM

## 2024-11-25 DIAGNOSIS — I25.119 CORONARY ARTERY DISEASE INVOLVING NATIVE CORONARY ARTERY OF NATIVE HEART WITH ANGINA PECTORIS: ICD-10-CM

## 2024-11-25 DIAGNOSIS — R74.01 ELEVATED ALT MEASUREMENT: ICD-10-CM

## 2024-11-25 DIAGNOSIS — E66.09 CLASS 1 OBESITY DUE TO EXCESS CALORIES WITHOUT SERIOUS COMORBIDITY WITH BODY MASS INDEX (BMI) OF 30.0 TO 30.9 IN ADULT: ICD-10-CM

## 2024-11-25 PROCEDURE — 1036F TOBACCO NON-USER: CPT | Performed by: INTERNAL MEDICINE

## 2024-11-25 PROCEDURE — 99214 OFFICE O/P EST MOD 30 MIN: CPT | Performed by: INTERNAL MEDICINE

## 2024-11-25 RX ORDER — PERMETHRIN 50 MG/G
CREAM TOPICAL
COMMUNITY
Start: 2024-11-06 | End: 2024-11-25 | Stop reason: ALTCHOICE

## 2024-11-25 ASSESSMENT — ENCOUNTER SYMPTOMS
ENDOCRINE NEGATIVE: 1
PSYCHIATRIC NEGATIVE: 1
CHEST TIGHTNESS: 0
SHORTNESS OF BREATH: 0
DIZZINESS: 0
DIARRHEA: 0
MUSCULOSKELETAL NEGATIVE: 1
HEMATOLOGIC/LYMPHATIC NEGATIVE: 1
RESPIRATORY NEGATIVE: 1
NAUSEA: 0
DYSURIA: 0
DIFFICULTY URINATING: 0
EYES NEGATIVE: 1
BLOOD IN STOOL: 0
GASTROINTESTINAL NEGATIVE: 1
ABDOMINAL PAIN: 0
NEUROLOGICAL NEGATIVE: 1
HEADACHES: 0
COUGH: 0
ROS SKIN COMMENTS: AS HPI
FATIGUE: 0

## 2024-11-25 NOTE — ASSESSMENT & PLAN NOTE
Tolerating rosuvastatin well   we will recheck hepatic panel and told him to avoid any NSAID for 2 weeks prior and see me back in 3 months

## 2024-11-25 NOTE — PROGRESS NOTES
Subjective   Patient ID: Torsten Sheehan is a 64 y.o. male who presents for Follow-up (Patient is here for a follow up. ).    Patient seen to follow-up on his elevated cardiac calcium score and dilated ascending aorta, recent past he saw cardiologist and increase his rosuvastatin up to 10 mg daily and has been feeling good, he lost 6 more pounds denies any side effects from the medication no chest pain or shortness of breath, no muscle pain nausea vomiting.  He is here to go over his lab result, ALT slightly above normal but he had a cold and was taking over-the-counter DayQuil,  He did have stress echo with the cardiologist and was told to follow-up with him in 1 year.  His respiratory symptoms resolved.  He was seen at urgent care earlier this month for itchy rash on his lower leg and told had scabies, responded to permethrin, he was at hotel in [place], no recurrence of his rash.         Review of Systems   Constitutional:  Negative for fatigue.        As HPI intentionally lost some weight.   HENT: Negative.  Negative for congestion.    Eyes: Negative.    Respiratory: Negative.  Negative for cough, chest tightness and shortness of breath.    Cardiovascular:  Negative for chest pain and leg swelling.   Gastrointestinal: Negative.  Negative for abdominal pain, blood in stool, diarrhea and nausea.   Endocrine: Negative.    Genitourinary: Negative.  Negative for difficulty urinating and dysuria.   Musculoskeletal: Negative.    Skin:         As HPI   Neurological: Negative.  Negative for dizziness and headaches.   Hematological: Negative.    Psychiatric/Behavioral: Negative.         Objective   /80 (BP Location: Left arm, Patient Position: Sitting)   Pulse 62   Temp 36.7 °C (98 °F) (Temporal)   Wt 93.7 kg (206 lb 9.6 oz)   SpO2 98%   BMI 28.81 kg/m²     Physical Exam  Vitals reviewed.   Constitutional:       Appearance: Normal appearance.   HENT:      Head: Normocephalic and atraumatic.   Eyes:       Extraocular Movements: Extraocular movements intact.      Pupils: Pupils are equal, round, and reactive to light.   Neck:      Vascular: No carotid bruit.   Cardiovascular:      Rate and Rhythm: Normal rate and regular rhythm.      Heart sounds: Normal heart sounds.   Pulmonary:      Effort: Pulmonary effort is normal. No respiratory distress.      Breath sounds: Normal breath sounds. No wheezing or rhonchi.   Abdominal:      General: Abdomen is flat. Bowel sounds are normal.      Palpations: Abdomen is soft.      Tenderness: There is no abdominal tenderness.   Musculoskeletal:         General: Normal range of motion.      Cervical back: Normal range of motion and neck supple.   Skin:     General: Skin is warm and dry.      Comments: Noted scarring as hyperpigmented skin lesion lower leg, skin off and did not reveal any rash in between his finger web.   Neurological:      General: No focal deficit present.      Mental Status: He is alert and oriented to person, place, and time.   Psychiatric:         Mood and Affect: Mood normal.         Behavior: Behavior normal.         Assessment/Plan   Problem List Items Addressed This Visit             ICD-10-CM    Hyperlipidemia - Primary E78.5     Tolerating rosuvastatin well   we will recheck hepatic panel and told him to avoid any NSAID for 2 weeks prior and see me back in 3 months         Impaired fasting glucose R73.01     Improving with diet and exercise.         Tubular adenoma of colon D12.6     Patient denies any change in BM or blood in his stool.  Next colonoscopy in January 2026.         Class 1 obesity with body mass index (BMI) of 30.0 to 30.9 in adult E66.811, Z68.30     BMI improving and is now 28.81, overweight.         Coronary artery disease involving native coronary artery of native heart with angina pectoris I25.119     Patient was seen by cardiologist and had a stress echo.  He denied any chest pain.  Continue statin.         Ascending aorta dilatation  (CMS-HCC) I77.810     Will monitor yearly.  Patient has seen by cardiologist.  Keep BP at goal.         Scabies B86     Resolved with treatment.         Elevated ALT measurement R74.01    Relevant Orders    Hepatic Function Panel

## 2025-02-24 ENCOUNTER — APPOINTMENT (OUTPATIENT)
Dept: DERMATOLOGY | Facility: CLINIC | Age: 65
End: 2025-02-24
Payer: COMMERCIAL

## 2025-02-24 DIAGNOSIS — D48.5 NEOPLASM OF UNCERTAIN BEHAVIOR OF SKIN: ICD-10-CM

## 2025-02-24 DIAGNOSIS — L81.4 LENTIGO: ICD-10-CM

## 2025-02-24 DIAGNOSIS — L82.1 SEBORRHEIC KERATOSIS: ICD-10-CM

## 2025-02-24 DIAGNOSIS — D18.01 HEMANGIOMA OF SKIN: ICD-10-CM

## 2025-02-24 DIAGNOSIS — Z12.83 SCREENING EXAM FOR SKIN CANCER: ICD-10-CM

## 2025-02-24 DIAGNOSIS — L57.8 OTHER SKIN CHANGES DUE TO CHRONIC EXPOSURE TO NONIONIZING RADIATION: Primary | ICD-10-CM

## 2025-02-24 DIAGNOSIS — L30.8 ASTEATOTIC ECZEMA: ICD-10-CM

## 2025-02-24 PROCEDURE — 11102 TANGNTL BX SKIN SINGLE LES: CPT | Performed by: STUDENT IN AN ORGANIZED HEALTH CARE EDUCATION/TRAINING PROGRAM

## 2025-02-24 PROCEDURE — 99214 OFFICE O/P EST MOD 30 MIN: CPT | Performed by: STUDENT IN AN ORGANIZED HEALTH CARE EDUCATION/TRAINING PROGRAM

## 2025-02-24 RX ORDER — TRIAMCINOLONE ACETONIDE 1 MG/G
CREAM TOPICAL 2 TIMES DAILY
Qty: 80 G | Refills: 5 | Status: SHIPPED | OUTPATIENT
Start: 2025-02-24

## 2025-02-24 NOTE — PROGRESS NOTES
Subjective     Torsten Sheehan is a 64 y.o. male who presents for the following: Skin Check (Annual skin check, no Hx of skin cancers. Had AK's treated with LN2 last visit, resolved. Reports had scabies in September (and cellulitis) treated with Augmentin and permithrin cream, only one time. Still has rash to left lower leg. ).     Review of Systems:  No other skin or systemic complaints other than what is documented elsewhere in the note.    The following portions of the chart were reviewed this encounter and updated as appropriate:         Skin Cancer History  No skin cancer on file.      Specialty Problems          Dermatology Problems    Hemangioma of skin    Lentiginosis    Seborrheic keratosis    Scabies        Objective   Well appearing patient in no apparent distress; mood and affect are within normal limits.    A full examination was performed including scalp, head, eyes, ears, nose, lips, neck, chest, axillae, abdomen, back, buttocks, bilateral upper extremities, bilateral lower extremities, hands, feet, fingers, toes, fingernails, and toenails. All findings within normal limits unless otherwise noted below.    Assessment/Plan   1. Other skin changes due to chronic exposure to nonionizing radiation  Mottled pigmentation, telangiectasias and brown reticular macules in sun exposed areas on the face, extremities, trunk    The risk of chronic, cumulative sun damage and risk of development of skin cancer was reviewed with the patient today. Discussed important of sun protection with sun protective clothing and/or broad spectrum sunscreen spf 30 or above.  Warning signs of skin cancer were reviewed. Patient to contact office should they notice any new or changing pre-existing skin lesion.    2. Seborrheic keratosis  Stuck on verrucous, tan-brown papules and plaques.      The benign nature of these skin lesions were reviewed with the patient today and reassurance was provided. Patient advised to return for f/u if  the lesions change in size, shape, color, become painful, tender, itch or bleed.    3. Hemangioma of skin  Bright red papules    Discussed benign nature of condition, reassured. Reviewed warning signs of skin cancer with patient.    4. Lentigo  Scattered tan macules in sun-exposed areas.    Benign nature of these skin lesions reviewed and relation to sun exposure discussed. Reassurance provided. Reviewed warning signs of skin cancer.    5. Screening exam for skin cancer    6. Asteatotic eczema  Left Lower Leg - Anterior, Right Lower Leg - Anterior  Xerotic scaly inflamed patches    The chronic and intermittently flaring nature of the diagnosis was explained to the patient.  Patient advised that this is common to affect the lower extremities and is related to dryness of the skin. Treatment options reviewed with the patient. Patient to apply triamcinolone 0.1% cream 2x daily x 2 weeks when red, dry, burning or itching. Side effects of topical steroids were reviewed including risk of skin atrophy. To prevent recurrences patient given gentle skin care regimen, including washing with a mild soap without fragrance such as dove for sensitive skin, cetaphil or cerave. Pat dry after washing and then apply a thick moisturizer such as Cerave cream or cetaphil cream.    triamcinolone (Kenalog) 0.1 % cream - Left Lower Leg - Anterior, Right Lower Leg - Anterior  Apply topically 2 times a day. 14 days on, 7 days off. Repeat as needed for rash.    7. Neoplasm of uncertain behavior of skin  Left Supraclavicular Area  6 mm erythematous papule          Lesion biopsy  Type of biopsy: tangential    Informed consent: discussed and consent obtained    Timeout: patient name, date of birth, surgical site, and procedure verified    Procedure prep:  Patient was prepped and draped  Anesthesia: the lesion was anesthetized in a standard fashion    Anesthetic:  1% lidocaine w/ epinephrine 1-100,000 local infiltration  Instrument used: DermaBlade     Hemostasis achieved with: aluminum chloride    Outcome: patient tolerated procedure well    Post-procedure details: sterile dressing applied and wound care instructions given    Dressing type: petrolatum and bandage      Staff Communication: Dermatology Local Anesthesia: 1 % Lidocaine / Epinephrine - Amount: 3 ml    Specimen 1 - Dermatopathology- DERM LAB  Differential Diagnosis: blk vs folliculitis vs bcc  Check Margins Yes/No?:    Comments:    Dermpath Lab: Routine Histopathology (formalin-fixed tissue)    Concerning lesion found on exam. DDX for lesion includes: r/o nmsc. The need for biopsy to aid in diagnosis was discussed and recommended. Risks and benefits of biopsy were reviewed. See procedure note.

## 2025-02-26 LAB
LABORATORY COMMENT REPORT: NORMAL
PATH REPORT.FINAL DX SPEC: NORMAL
PATH REPORT.GROSS SPEC: NORMAL
PATH REPORT.MICROSCOPIC SPEC OTHER STN: NORMAL
PATH REPORT.RELEVANT HX SPEC: NORMAL
PATH REPORT.TOTAL CANCER: NORMAL

## 2025-02-27 NOTE — RESULT ENCOUNTER NOTE
"Patient has seen Left Supraclavicular shave biopsy results and \"Patient Communication\" on 2/26/2025 at 4:37 PM: Lichenoid Keratosis. Per Dr. Fisher, result is benign and requires no further treatment."

## 2025-04-25 ENCOUNTER — APPOINTMENT (OUTPATIENT)
Dept: PRIMARY CARE | Facility: CLINIC | Age: 65
End: 2025-04-25
Payer: COMMERCIAL

## 2025-04-25 VITALS
DIASTOLIC BLOOD PRESSURE: 84 MMHG | HEART RATE: 63 BPM | OXYGEN SATURATION: 97 % | TEMPERATURE: 98 F | SYSTOLIC BLOOD PRESSURE: 138 MMHG | HEIGHT: 71 IN | WEIGHT: 210.6 LBS | BODY MASS INDEX: 29.48 KG/M2

## 2025-04-25 DIAGNOSIS — E66.811 CLASS 1 OBESITY DUE TO EXCESS CALORIES WITH SERIOUS COMORBIDITY AND BODY MASS INDEX (BMI) OF 30.0 TO 30.9 IN ADULT: ICD-10-CM

## 2025-04-25 DIAGNOSIS — R93.1 AGATSTON CAC SCORE 200-399: ICD-10-CM

## 2025-04-25 DIAGNOSIS — E66.09 CLASS 1 OBESITY DUE TO EXCESS CALORIES WITH SERIOUS COMORBIDITY AND BODY MASS INDEX (BMI) OF 30.0 TO 30.9 IN ADULT: ICD-10-CM

## 2025-04-25 DIAGNOSIS — I25.119 CORONARY ARTERY DISEASE INVOLVING NATIVE CORONARY ARTERY OF NATIVE HEART WITH ANGINA PECTORIS: ICD-10-CM

## 2025-04-25 DIAGNOSIS — E78.49 OTHER HYPERLIPIDEMIA: ICD-10-CM

## 2025-04-25 DIAGNOSIS — Z00.00 HEALTH CARE MAINTENANCE: ICD-10-CM

## 2025-04-25 DIAGNOSIS — D12.6 TUBULAR ADENOMA OF COLON: ICD-10-CM

## 2025-04-25 DIAGNOSIS — Z00.00 ANNUAL PHYSICAL EXAM: Primary | ICD-10-CM

## 2025-04-25 DIAGNOSIS — N52.9 ERECTILE DYSFUNCTION, UNSPECIFIED ERECTILE DYSFUNCTION TYPE: ICD-10-CM

## 2025-04-25 DIAGNOSIS — R68.89 FORGETFULNESS: ICD-10-CM

## 2025-04-25 DIAGNOSIS — R73.01 IMPAIRED FASTING GLUCOSE: ICD-10-CM

## 2025-04-25 PROCEDURE — 90715 TDAP VACCINE 7 YRS/> IM: CPT | Performed by: INTERNAL MEDICINE

## 2025-04-25 PROCEDURE — 90471 IMMUNIZATION ADMIN: CPT | Performed by: INTERNAL MEDICINE

## 2025-04-25 PROCEDURE — 93000 ELECTROCARDIOGRAM COMPLETE: CPT | Performed by: INTERNAL MEDICINE

## 2025-04-25 PROCEDURE — 3008F BODY MASS INDEX DOCD: CPT | Performed by: INTERNAL MEDICINE

## 2025-04-25 PROCEDURE — 99396 PREV VISIT EST AGE 40-64: CPT | Performed by: INTERNAL MEDICINE

## 2025-04-25 RX ORDER — DILTIAZEM HYDROCHLORIDE 120 MG/1
120 CAPSULE, COATED, EXTENDED RELEASE ORAL DAILY
Qty: 90 CAPSULE | Refills: 3 | Status: SHIPPED | OUTPATIENT
Start: 2025-04-25

## 2025-04-25 RX ORDER — ROSUVASTATIN CALCIUM 10 MG/1
10 TABLET, COATED ORAL DAILY
Qty: 90 TABLET | Refills: 3 | Status: SHIPPED | OUTPATIENT
Start: 2025-04-25 | End: 2026-04-25

## 2025-04-25 ASSESSMENT — ENCOUNTER SYMPTOMS
HEMATOLOGIC/LYMPHATIC NEGATIVE: 1
CHEST TIGHTNESS: 0
ABDOMINAL PAIN: 0
UNEXPECTED WEIGHT CHANGE: 0
FATIGUE: 0
NEUROLOGICAL NEGATIVE: 1
GASTROINTESTINAL NEGATIVE: 1
PSYCHIATRIC NEGATIVE: 1
ENDOCRINE NEGATIVE: 1
DIARRHEA: 0
NAUSEA: 0
HEADACHES: 0
DIZZINESS: 0
EYES NEGATIVE: 1
SHORTNESS OF BREATH: 0
DIFFICULTY URINATING: 0
BLOOD IN STOOL: 0
DYSURIA: 0
RESPIRATORY NEGATIVE: 1
COUGH: 0

## 2025-04-25 NOTE — PROGRESS NOTES
"Subjective   Patient ID: Torsten Sheehan is a 64 y.o. male who presents for Annual Exam (Patient is here for an annual physical exam. ).    Here for CPE and to follow-up on HTN,HLD,he sees his cardiologist in [place] for his elevated cardiac calcium score and dilated ascending aorta,has an appt with him in June.  He has an abrasion left pretibial area,no bleeding,no sign of infection.  He is forgetful at time,\"has many things going on\",his mother had dementia in her 90's.  He has his usual tinnitus.  He gets Viagra online and has low cost.  No claudication.  He quit smoking in 2005,was 1 ppd   Last colonoscopy 1/7/2021,next in 5 years.  Last PSA 5/2/2024  He had CT cardiac scoring on 2/18/2022   score was 225, he sees cardiologist  He sees chiropractor for his back pain,resolved.  He sees dermatologist.  He denies any chest pain shortness of breath or cough no recurrence of his neck mass, he was seen by ENT in past.  I Reviewed with patient his CT from February 2022 which showed elevated cardiac calcium score in the 225  and thoracic aortic aneurysm of 4 cm.             Review of Systems   Constitutional:  Negative for fatigue and unexpected weight change.   HENT: Negative.  Negative for congestion.    Eyes: Negative.    Respiratory: Negative.  Negative for cough, chest tightness and shortness of breath.    Cardiovascular:  Negative for chest pain and leg swelling.   Gastrointestinal: Negative.  Negative for abdominal pain, blood in stool, diarrhea and nausea.   Endocrine: Negative.    Genitourinary: Negative.  Negative for difficulty urinating and dysuria.   Neurological: Negative.  Negative for dizziness and headaches.   Hematological: Negative.    Psychiatric/Behavioral: Negative.         Objective   /84 (BP Location: Left arm, Patient Position: Sitting)   Pulse 63   Temp 36.7 °C (98 °F) (Temporal)   Ht 1.797 m (5' 10.75\")   Wt 95.5 kg (210 lb 9.6 oz)   SpO2 97%   BMI 29.58 kg/m²     Physical Exam  Vitals " reviewed.   Constitutional:       General: He is not in acute distress.     Appearance: Normal appearance. He is not ill-appearing.   HENT:      Head: Normocephalic and atraumatic.      Right Ear: Tympanic membrane, ear canal and external ear normal. There is no impacted cerumen.      Left Ear: Tympanic membrane, ear canal and external ear normal. There is no impacted cerumen.      Mouth/Throat:      Mouth: Mucous membranes are moist.      Pharynx: No oropharyngeal exudate or posterior oropharyngeal erythema.   Eyes:      General: No scleral icterus.     Extraocular Movements: Extraocular movements intact.      Conjunctiva/sclera: Conjunctivae normal.      Pupils: Pupils are equal, round, and reactive to light.   Neck:      Vascular: No carotid bruit.   Cardiovascular:      Rate and Rhythm: Normal rate and regular rhythm.      Pulses: Normal pulses.      Heart sounds: Normal heart sounds. No murmur heard.  Pulmonary:      Effort: Pulmonary effort is normal. No respiratory distress.      Breath sounds: Normal breath sounds. No wheezing or rales.   Abdominal:      General: Abdomen is flat. Bowel sounds are normal. There is no distension.      Palpations: Abdomen is soft.      Tenderness: There is no abdominal tenderness. There is no right CVA tenderness or left CVA tenderness.   Musculoskeletal:         General: Normal range of motion.      Cervical back: Normal range of motion and neck supple.      Right lower leg: No edema.      Left lower leg: No edema.   Lymphadenopathy:      Cervical: No cervical adenopathy.   Skin:     Comments: Clean scrape left pretibial area   Neurological:      General: No focal deficit present.      Mental Status: He is alert and oriented to person, place, and time.      Cranial Nerves: No cranial nerve deficit.      Motor: No weakness.      Deep Tendon Reflexes: Reflexes normal.   Psychiatric:         Mood and Affect: Mood normal.         Assessment/Plan   Problem List Items Addressed This  Visit           ICD-10-CM    Hyperlipidemia E78.5    Tolerating rosuvastatin well           Impaired fasting glucose R73.01    Improving with diet and exercise.         Relevant Orders    Hemoglobin A1C (Completed)    Tubular adenoma of colon D12.6    Class 1 obesity with body mass index (BMI) of 30.0 to 30.9 in adult E66.811, Z68.30    BMI improving is 29.58 with discussed diet with exercise         Erectile dysfunction N52.9    Annual physical exam - Primary Z00.00    Complete physical examination completed today.  Advised to keep a heart healthy, low-fat diet as recommended diet is the Mediterranean diet.  Advised to exercise regularly for 30 minutes daily 5 days a week and maintain 150 minutes of exercise per week.  Discussed age-appropriate cancer screening,Immunization and recommendation were given.  Advised on regular eye and dental visit.  Advised on staying well-hydrated.         Coronary artery disease involving native coronary artery of native heart with angina pectoris I25.119    Continue statin  Follow-up with the cardiologist  Advised patient to monitor his blood pressure closely at home and to discuss his reading with the cardiologist in June with his appointment         Relevant Medications    dilTIAZem CD (Cardizem CD) 120 mg 24 hr capsule     Other Visit Diagnoses         Codes      Agatston CAC score 200-399     R93.1    Relevant Medications    dilTIAZem CD (Cardizem CD) 120 mg 24 hr capsule    rosuvastatin (Crestor) 10 mg tablet      Health care maintenance     Z00.00    Relevant Orders    ECG 12 lead (Clinic Performed) (Completed)    CBC (Completed)    Comprehensive Metabolic Panel (Completed)    Lipid Panel (Completed)    TSH with reflex to Free T4 if abnormal (Completed)    PSA (Completed)      Forgetfulness     R68.89    Relevant Orders    Vitamin B12 (Completed)    Folate (Completed)

## 2025-04-25 NOTE — ASSESSMENT & PLAN NOTE
Continue statin  Follow-up with the cardiologist  Advised patient to monitor his blood pressure closely at home and to discuss his reading with the cardiologist in June with his appointment

## 2025-04-26 LAB
ALBUMIN SERPL-MCNC: 4.4 G/DL (ref 3.6–5.1)
ALP SERPL-CCNC: 49 U/L (ref 35–144)
ALT SERPL-CCNC: 52 U/L (ref 9–46)
ANION GAP SERPL CALCULATED.4IONS-SCNC: 8 MMOL/L (CALC) (ref 7–17)
AST SERPL-CCNC: 27 U/L (ref 10–35)
BILIRUB SERPL-MCNC: 0.6 MG/DL (ref 0.2–1.2)
BUN SERPL-MCNC: 17 MG/DL (ref 7–25)
CALCIUM SERPL-MCNC: 9.1 MG/DL (ref 8.6–10.3)
CHLORIDE SERPL-SCNC: 102 MMOL/L (ref 98–110)
CHOLEST SERPL-MCNC: 147 MG/DL
CHOLEST/HDLC SERPL: 2.3 (CALC)
CO2 SERPL-SCNC: 30 MMOL/L (ref 20–32)
CREAT SERPL-MCNC: 1.04 MG/DL (ref 0.7–1.35)
EGFRCR SERPLBLD CKD-EPI 2021: 81 ML/MIN/1.73M2
ERYTHROCYTE [DISTWIDTH] IN BLOOD BY AUTOMATED COUNT: 12.8 % (ref 11–15)
EST. AVERAGE GLUCOSE BLD GHB EST-MCNC: 134 MG/DL
EST. AVERAGE GLUCOSE BLD GHB EST-SCNC: 7.4 MMOL/L
FOLATE SERPL-MCNC: 12.3 NG/ML
GLUCOSE SERPL-MCNC: 109 MG/DL (ref 65–99)
HBA1C MFR BLD: 6.3 %
HCT VFR BLD AUTO: 42.2 % (ref 38.5–50)
HDLC SERPL-MCNC: 63 MG/DL
HGB BLD-MCNC: 13.8 G/DL (ref 13.2–17.1)
LDLC SERPL CALC-MCNC: 66 MG/DL (CALC)
MCH RBC QN AUTO: 27.1 PG (ref 27–33)
MCHC RBC AUTO-ENTMCNC: 32.7 G/DL (ref 32–36)
MCV RBC AUTO: 82.9 FL (ref 80–100)
NONHDLC SERPL-MCNC: 84 MG/DL (CALC)
PLATELET # BLD AUTO: 181 THOUSAND/UL (ref 140–400)
PMV BLD REES-ECKER: 10.1 FL (ref 7.5–12.5)
POTASSIUM SERPL-SCNC: 4.8 MMOL/L (ref 3.5–5.3)
PROT SERPL-MCNC: 6.5 G/DL (ref 6.1–8.1)
PSA SERPL-MCNC: 0.32 NG/ML
RBC # BLD AUTO: 5.09 MILLION/UL (ref 4.2–5.8)
SODIUM SERPL-SCNC: 140 MMOL/L (ref 135–146)
TRIGL SERPL-MCNC: 97 MG/DL
TSH SERPL-ACNC: 1.84 MIU/L (ref 0.4–4.5)
VIT B12 SERPL-MCNC: 274 PG/ML (ref 200–1100)
WBC # BLD AUTO: 5.8 THOUSAND/UL (ref 3.8–10.8)

## 2025-06-06 PROBLEM — E66.811 CLASS 1 OBESITY WITH BODY MASS INDEX (BMI) OF 30.0 TO 30.9 IN ADULT: Status: RESOLVED | Noted: 2023-04-10 | Resolved: 2025-06-06

## 2025-07-03 ENCOUNTER — APPOINTMENT (OUTPATIENT)
Dept: CARDIOLOGY | Facility: CLINIC | Age: 65
End: 2025-07-03
Payer: COMMERCIAL

## 2025-07-03 VITALS
WEIGHT: 205 LBS | DIASTOLIC BLOOD PRESSURE: 68 MMHG | BODY MASS INDEX: 28.79 KG/M2 | OXYGEN SATURATION: 96 % | HEART RATE: 74 BPM | SYSTOLIC BLOOD PRESSURE: 130 MMHG

## 2025-07-03 DIAGNOSIS — I77.810 ASCENDING AORTA DILATATION: ICD-10-CM

## 2025-07-03 DIAGNOSIS — I25.10 CORONARY ARTERY DISEASE INVOLVING NATIVE CORONARY ARTERY OF NATIVE HEART WITHOUT ANGINA PECTORIS: ICD-10-CM

## 2025-07-03 DIAGNOSIS — R00.2 PALPITATIONS: Primary | ICD-10-CM

## 2025-07-03 DIAGNOSIS — R06.09 EXERTIONAL DYSPNEA: ICD-10-CM

## 2025-07-03 DIAGNOSIS — R93.1 ABNORMAL ECHOCARDIOGRAM: ICD-10-CM

## 2025-07-03 DIAGNOSIS — R94.31 ABNORMAL EKG: ICD-10-CM

## 2025-07-03 DIAGNOSIS — E78.49 OTHER HYPERLIPIDEMIA: ICD-10-CM

## 2025-07-03 PROBLEM — I25.119 CORONARY ARTERY DISEASE INVOLVING NATIVE CORONARY ARTERY OF NATIVE HEART WITH ANGINA PECTORIS: Status: RESOLVED | Noted: 2024-05-03 | Resolved: 2025-07-03

## 2025-07-03 PROCEDURE — 99213 OFFICE O/P EST LOW 20 MIN: CPT | Performed by: INTERNAL MEDICINE

## 2025-07-03 PROCEDURE — 99212 OFFICE O/P EST SF 10 MIN: CPT

## 2025-07-03 NOTE — PATIENT INSTRUCTIONS
Take one baby aspirin 81 mg daily.    No changes in your other medications.    We are going to do an echocardiogram on your next visit.

## 2025-07-03 NOTE — PROGRESS NOTES
Subjective   Torsten Sheehan is a 65 y.o. male.    Chief Complaint:  Follow-up coronary disease, dilated ascending aorta.  Exertional dyspnea.    HPI    From a cardiac standpoint he has done well.  Has not had any cardiac issues.  No chest discomfort or chest pressure.  No increased shortness of breath from baseline.  He has had some noncardiac problems including back problems and back pains.  Feeling better on current medical management.  Did not feel well on metoprolol.  Doing well on diltiazem.    His cardiac history is significant for coronary artery disease based on a positive CT calcium score of 226.  This is consistent with the presence of mild to moderate atherosclerotic coronary artery disease.     Patient has a mildly dilated ascending aorta at 4.0 cm.     Past medical history: Significant for hernia repair and a lymph node biopsy.  He has borderline diabetes.  History of hyperlipidemia.     Allergies to medications: None known     Social history: Former smoker, quit in 2005.     Family history: Mother had myocardial infarction's in her 70s but lived to .  Father had coronary disease diabetes and bladder cancer.     Review of Systems   Constitutional: Positive for malaise/fatigue.   Cardiovascular:  Positive for dyspnea on exertion.       Current Medications[1]     Visit Vitals  /68   Pulse 74   Wt 93 kg (205 lb)   SpO2 96%   BMI 28.79 kg/m²   Smoking Status Former   BSA 2.15 m²        Objective     Constitutional:       Appearance: Not in distress.   Neck:      Vascular: JVD normal.   Pulmonary:      Breath sounds: Normal breath sounds.   Cardiovascular:      Normal rate. Regular rhythm. S1 with normal intensity. S2 with normal intensity.       Murmurs: There is no murmur.      No gallop.    Pulses:     Intact distal pulses.   Edema:     Peripheral edema absent.   Abdominal:      General: Bowel sounds are normal.   Neurological:      Mental Status: Alert and oriented to person, place and time.          Lab Review:   Lab Results   Component Value Date     04/25/2025    K 4.8 04/25/2025     04/25/2025    CO2 30 04/25/2025    BUN 17 04/25/2025    CREATININE 1.04 04/25/2025    GLUCOSE 109 (H) 04/25/2025    CALCIUM 9.1 04/25/2025     Lab Results   Component Value Date    WBC 5.8 04/25/2025    HGB 13.8 04/25/2025    HCT 42.2 04/25/2025    MCV 82.9 04/25/2025     04/25/2025     Lab Results   Component Value Date    CHOL 147 04/25/2025    TRIG 97 04/25/2025    HDL 63 04/25/2025       Assessment:    1.  Coronary artery disease.  Moderate coronary disease but a relatively high score for his age.  Last year we did a stress echo study which was normal showing good exercise ability with no EKG changes and no echocardiographic imaging abnormalities.  Has not had any typical anginal symptoms.    2.  Hyperlipidemia.  Cholesterol 147, HDL 63, LDL 66.    3.  Palpitations.  Doing well on diltiazem.  Did not feel well on beta-blockers.    4.  Hypertension.  Blood pressures are controlled.         [1]   Current Outpatient Medications   Medication Sig Dispense Refill    dilTIAZem CD (Cardizem CD) 120 mg 24 hr capsule Take 1 capsule (120 mg) by mouth once daily. 90 capsule 3    rosuvastatin (Crestor) 10 mg tablet Take 1 tablet (10 mg) by mouth once daily. 90 tablet 3    sildenafil (Viagra) 100 mg tablet Take 1 tablet (100 mg) by mouth once daily as needed for erectile dysfunction. 12 tablet 3    albuterol 90 mcg/actuation inhaler Inhale 2 puffs every 6 hours if needed for wheezing. 18 g 11     No current facility-administered medications for this visit.

## 2025-10-24 ENCOUNTER — APPOINTMENT (OUTPATIENT)
Dept: PRIMARY CARE | Facility: CLINIC | Age: 65
End: 2025-10-24
Payer: COMMERCIAL

## 2026-02-27 ENCOUNTER — APPOINTMENT (OUTPATIENT)
Dept: DERMATOLOGY | Facility: CLINIC | Age: 66
End: 2026-02-27
Payer: COMMERCIAL

## 2026-04-24 ENCOUNTER — APPOINTMENT (OUTPATIENT)
Dept: PRIMARY CARE | Facility: CLINIC | Age: 66
End: 2026-04-24
Payer: COMMERCIAL